# Patient Record
Sex: FEMALE | Race: WHITE | Employment: OTHER | URBAN - METROPOLITAN AREA
[De-identification: names, ages, dates, MRNs, and addresses within clinical notes are randomized per-mention and may not be internally consistent; named-entity substitution may affect disease eponyms.]

---

## 2017-06-16 ENCOUNTER — OFFICE VISIT (OUTPATIENT)
Dept: PHYSICAL THERAPY | Age: 82
End: 2017-06-16
Attending: ORTHOPAEDIC SURGERY
Payer: MEDICARE

## 2017-06-16 DIAGNOSIS — M19.012 GLENOHUMERAL ARTHRITIS, LEFT: Primary | ICD-10-CM

## 2017-06-16 PROCEDURE — 97110 THERAPEUTIC EXERCISES: CPT

## 2017-06-16 PROCEDURE — 97163 PT EVAL HIGH COMPLEX 45 MIN: CPT

## 2017-06-16 NOTE — PROGRESS NOTES
UPPER EXTREMITY EVALUATION:   Referring Physician: Dr. Kaya Roberts  Diagnosis: Left shoulder OA     Date of Service: 6/16/2017     PATIENT SUMMARY   Enriqueta Mcdaniel is a 80year old y/o female who presents to therapy today with complaints of left shoulder pa restrictions    Strength/MMT:  Shoulder Elbow Scapular   Flexion: R 4/5; L 2+/5  Abduction: R 4/5; L 2+/5  ER: R 4/5; L 2/5  IR: R 4/5; L 4/5 Flexion: R 4/5; L 4/5  Extension: R 4/5; L 4/5   Rhomboids: R 3-/5, L 3-/5  Mid trap: R 3-/5; L 3-/5        Nima Magaña actively participate in planning and for this course of care. Thank you for your referral. Please co-sign or sign and return this letter via fax as soon as possible to [phone].  If you have any questions, please contact me at Dept: [phone]    S

## 2017-06-20 ENCOUNTER — OFFICE VISIT (OUTPATIENT)
Dept: PHYSICAL THERAPY | Age: 82
End: 2017-06-20
Attending: ORTHOPAEDIC SURGERY
Payer: MEDICARE

## 2017-06-20 PROCEDURE — 97110 THERAPEUTIC EXERCISES: CPT

## 2017-06-20 PROCEDURE — 97140 MANUAL THERAPY 1/> REGIONS: CPT

## 2017-06-20 NOTE — PROGRESS NOTES
Dx: Glenohumeral arthritis, left (Z39.735)         Authorized # of Visits:           Next MD visit: none scheduled  Fall Risk: standard         Precautions: n/a             Subjective: No new problems.  Reports that she is working on HEP without increased p

## 2017-06-23 ENCOUNTER — APPOINTMENT (OUTPATIENT)
Dept: PHYSICAL THERAPY | Age: 82
End: 2017-06-23
Payer: MEDICARE

## 2017-06-26 ENCOUNTER — OFFICE VISIT (OUTPATIENT)
Dept: PHYSICAL THERAPY | Age: 82
End: 2017-06-26
Attending: ORTHOPAEDIC SURGERY
Payer: MEDICARE

## 2017-06-26 PROCEDURE — 97140 MANUAL THERAPY 1/> REGIONS: CPT

## 2017-06-26 PROCEDURE — 97110 THERAPEUTIC EXERCISES: CPT

## 2017-06-26 NOTE — PROGRESS NOTES
Dx: Glenohumeral arthritis, left (H64.849)         Authorized # of Visits:           Next MD visit: none scheduled  Fall Risk: standard         Precautions: n/a             Subjective: No new problems.  Reports that she was pretty sore the day after last th Seated: left sh abd to 90 deg x10  Flexion to 90 deg x10        Seated scapular squeeze 10x 5 sec Seated scapular squeeze 10x 5 sec        Seated left shoulder AROM flexion full available ROM x10 --        Seated: left shoulder AAROM cane flexion x10 --

## 2017-06-29 ENCOUNTER — APPOINTMENT (OUTPATIENT)
Dept: PHYSICAL THERAPY | Age: 82
End: 2017-06-29
Payer: MEDICARE

## 2017-07-03 ENCOUNTER — OFFICE VISIT (OUTPATIENT)
Dept: PHYSICAL THERAPY | Age: 82
End: 2017-07-03
Attending: INTERNAL MEDICINE
Payer: MEDICARE

## 2017-07-03 PROCEDURE — 97110 THERAPEUTIC EXERCISES: CPT

## 2017-07-03 PROCEDURE — 97140 MANUAL THERAPY 1/> REGIONS: CPT

## 2017-07-03 NOTE — PROGRESS NOTES
Dx: Glenohumeral arthritis, left (L55.537)         Authorized # of Visits:           Next MD visit: none scheduled  Fall Risk: standard         Precautions: n/a             Subjective: No new problems.  Reports that she is feeling better with the ability to AA/PROM left shoulder flex, abd x 10 minutes. Attempted IR/ER but too painful AA/PROM left shoulder flex, abd, ER x 10 minutes. AA/PROM left shoulder flex, abd, ER x 10 minutes.         Supine : AROM flexion to 90 deg x15 Supine : AROM flexion to 90 deg

## 2017-07-06 ENCOUNTER — APPOINTMENT (OUTPATIENT)
Dept: PHYSICAL THERAPY | Age: 82
End: 2017-07-06
Payer: MEDICARE

## 2017-07-07 ENCOUNTER — APPOINTMENT (OUTPATIENT)
Dept: PHYSICAL THERAPY | Age: 82
End: 2017-07-07
Payer: MEDICARE

## 2017-07-10 ENCOUNTER — APPOINTMENT (OUTPATIENT)
Dept: PHYSICAL THERAPY | Age: 82
End: 2017-07-10
Payer: MEDICARE

## 2019-10-12 ENCOUNTER — APPOINTMENT (OUTPATIENT)
Dept: CT IMAGING | Facility: HOSPITAL | Age: 84
End: 2019-10-12
Attending: EMERGENCY MEDICINE
Payer: MEDICARE

## 2019-10-12 ENCOUNTER — HOSPITAL ENCOUNTER (OUTPATIENT)
Facility: HOSPITAL | Age: 84
Setting detail: OBSERVATION
Discharge: NURSING, REHAB OR LONG-TERM CARE | End: 2019-10-17
Attending: EMERGENCY MEDICINE | Admitting: HOSPITALIST
Payer: MEDICARE

## 2019-10-12 DIAGNOSIS — R53.1 WEAKNESS: ICD-10-CM

## 2019-10-12 DIAGNOSIS — S22.080A COMPRESSION FRACTURE OF T12 VERTEBRA, INITIAL ENCOUNTER (HCC): ICD-10-CM
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
## 2019-10-12 DIAGNOSIS — N39.0 URINARY TRACT INFECTION WITHOUT HEMATURIA, SITE UNSPECIFIED: ICD-10-CM

## 2019-10-12 PROBLEM — R73.9 HYPERGLYCEMIA: Status: ACTIVE | Noted: 2019-10-12

## 2019-10-12 PROCEDURE — 70450 CT HEAD/BRAIN W/O DYE: CPT | Performed by: EMERGENCY MEDICINE

## 2019-10-12 PROCEDURE — 72125 CT NECK SPINE W/O DYE: CPT | Performed by: EMERGENCY MEDICINE

## 2019-10-12 PROCEDURE — 99220 INITIAL OBSERVATION CARE,LEVL III: CPT | Performed by: HOSPITALIST

## 2019-10-12 PROCEDURE — 72131 CT LUMBAR SPINE W/O DYE: CPT | Performed by: EMERGENCY MEDICINE

## 2019-10-12 RX ORDER — TRIAMTERENE AND HYDROCHLOROTHIAZIDE 37.5; 25 MG/1; MG/1
1 CAPSULE ORAL EVERY MORNING
Status: DISCONTINUED | OUTPATIENT
Start: 2019-10-12 | End: 2019-10-17

## 2019-10-12 RX ORDER — ALLOPURINOL 100 MG/1
100 TABLET ORAL DAILY
Status: DISCONTINUED | OUTPATIENT
Start: 2019-10-12 | End: 2019-10-17

## 2019-10-12 RX ORDER — ATENOLOL 50 MG/1
50 TABLET ORAL 2 TIMES DAILY
Status: DISCONTINUED | OUTPATIENT
Start: 2019-10-12 | End: 2019-10-17

## 2019-10-12 RX ORDER — SODIUM CHLORIDE 9 MG/ML
INJECTION, SOLUTION INTRAVENOUS CONTINUOUS
Status: ACTIVE | OUTPATIENT
Start: 2019-10-12 | End: 2019-10-12

## 2019-10-12 RX ORDER — ONDANSETRON 2 MG/ML
4 INJECTION INTRAMUSCULAR; INTRAVENOUS EVERY 6 HOURS PRN
Status: DISCONTINUED | OUTPATIENT
Start: 2019-10-12 | End: 2019-10-17

## 2019-10-12 RX ORDER — ACETAMINOPHEN 325 MG/1
650 TABLET ORAL EVERY 6 HOURS PRN
Status: DISCONTINUED | OUTPATIENT
Start: 2019-10-12 | End: 2019-10-17

## 2019-10-12 RX ORDER — ASPIRIN 81 MG/1
81 TABLET, CHEWABLE ORAL DAILY
Status: DISCONTINUED | OUTPATIENT
Start: 2019-10-12 | End: 2019-10-17

## 2019-10-12 RX ORDER — ATORVASTATIN CALCIUM 20 MG/1
20 TABLET, FILM COATED ORAL NIGHTLY
Status: DISCONTINUED | OUTPATIENT
Start: 2019-10-12 | End: 2019-10-17

## 2019-10-12 RX ORDER — LOSARTAN POTASSIUM 50 MG/1
25 TABLET ORAL DAILY
Status: DISCONTINUED | OUTPATIENT
Start: 2019-10-12 | End: 2019-10-17

## 2019-10-12 RX ORDER — POTASSIUM CHLORIDE 20 MEQ/1
40 TABLET, EXTENDED RELEASE ORAL ONCE
Status: COMPLETED | OUTPATIENT
Start: 2019-10-12 | End: 2019-10-12

## 2019-10-12 NOTE — ED NOTES
Report given to [facility]. Transport paged.

## 2019-10-12 NOTE — ED INITIAL ASSESSMENT (HPI)
Patient had unwitnessed fall in bathroom this morning. Patient's  went to bed at around midnight and woke up at 0330 to go to the bathroom when he heard her screaming. EMS arrived on scene with patient slumped against wall in the bathroom.  Per husba

## 2019-10-12 NOTE — ED NOTES
Pt yells from room \"I'm pee'ing\"  Pt sheets removed and cleaned prior to transport. New sheet and chux placed.

## 2019-10-12 NOTE — ED PROVIDER NOTES
Patient Seen in: [facility] Emergency Department      History   Patient presents with:  Fall (musculoskeletal, neurologic)    Stated Complaint: Fall    HPI    Patient is an 22-year-old female presents to ED after a fall.   History obtained through h 165/59   Pulse 67   Resp 18   Temp 97.7 °F (36.5 °C)   Temp src Temporal   SpO2 94 %   O2 Device None (Room air)       Current:/62 (BP Location: Left arm)   Pulse 70   Temp 98.6 °F (37 °C) (Oral)   Resp 16   Ht 154.9 cm (5' 1\")   Wt 75.8 kg   SpO2 9 -American 47 (*)     All other components within normal limits   URINALYSIS WITH CULTURE REFLEX - Abnormal; Notable for the following components:    Clarity Urine Cloudy (*)     Protein Urine 30  (*)     Leukocyte Esterase Urine Large (*)     WBC Ur sulci.  Ventricles are normal in size and location. INTRACRANIAL:  Symmetric low attenuation of cerebral white matter is consistent with chronic microvascular ischemic changes. No intracranial mass or hemorrhage. No sign of acute territorial infarction. narrowing. C4-C5:  Disc space narrowing and broad-based posterior disc/osteophyte complex. No spinal stenosis. Moderate bilateral facet hypertrophy and foraminal narrowing. C5-C6:  Disc space narrowing with partial fusion across the disc space.   Small br sclerosis and retrolisthesis. Broad-based posterior disc/osteophyte complex without spinal stenosis. Mild bilateral foraminal narrowing. L2-L3:  Vacuum disc degeneration and diffuse disc bulging without spinal stenosis.   Normal facet joints and neural fo vertebra, initial encounter [facility])  Weakness    Disposition:  Admit  10/12/2019  8:08 am    Follow-up:  Rigo Gotti, [address] [facility] 4 [phone]      As needed        Medications Prescribed:  Current Discha

## 2019-10-12 NOTE — ED NOTES
Attempt to walk pt with walker. Pt unsteady. Spouse sts gait is worse then usual. Spouse sts he is not comfortable taking pt home. MD updated.

## 2019-10-12 NOTE — ED NOTES
Round on pt. Pt encouraged to keep R arm straight for abx to infuse. No distress noted. Pt updated on eta to floor. Pt dry, no incontinence noted.  Pt denies any needs

## 2019-10-12 NOTE — CM/SW NOTE
Patient presents with her spouse. She has dementia and lives at home with him. They have no caregivers or help at home. Her spouse reports that she fell last week but was walking okay and they did not come to the ED to get evaluated.  This time, she was wea

## 2019-10-12 NOTE — H&P
ERASMO HOSPITALIST  History and Physical     Cesilia Osuna Patient Status:  Emergency    1934 MRN [id number]   Location [address] Attending Juliet Ly MD   Hosp Day # 0 PCP Sharlene Rose MD [facility]     Chief Complaint:  Bianca Daily Tab, Take 50 mg by mouth 2 (two) times daily. , Disp: , Rfl:   Atorvastatin Calcium (LIPITOR) 20 MG Oral Tab, Take 20 mg by mouth nightly., Disp: , Rfl:   Losartan Potassium (COZAAR) 25 MG Oral Tab, Take 25 mg by mouth daily. , Disp: , Rfl:   Debbie Monique [facility] antibiotics, await cultures  2. Compression fracture: Age is indeterminant, she has no tenderness at this time of her back. PT evaluation. No bracing at this time as it appears to be hold given clinical symptomatology. 3. Hypertension  4.  Hyperlipidemia

## 2019-10-13 PROCEDURE — 99225 SUBSEQUENT OBSERVATION CARE: CPT | Performed by: HOSPITALIST

## 2019-10-13 NOTE — PLAN OF CARE
Received pt from the ER about 1100. Pt weak, unable to walk. Using bedpan and start the pur wick . PT/OT consulted, will see if pt will need rehab.  Family at bedside, all questions and concerns addressed support given, will monitor

## 2019-10-13 NOTE — PHYSICAL THERAPY NOTE
Order received for physical Therapy evaluation. Noted in chart patient with MRI pending due to T12 fracture indeterminate age on CT. Per MD note PT following MRI as appropriate, confirmed with RN. Will reattempt PT services as able pending MRI results.

## 2019-10-13 NOTE — PROGRESS NOTES
ERASMO HOSPITALIST  Progress Note     Daria Cox Patient Status:  Observation    1934 MRN [id number]   [facility] 3NE-A Attending Stephan Eli MD   Hosp Day # 0 PCP Pauly Alexandra     Chief Complaint: Rudolph Rocha: Patient with Geovany Barekr mg Oral Nightly   • Dabigatran Etexilate Mesylate  75 mg Oral BID   • Losartan Potassium  25 mg Oral Daily   • Triamterene-HCTZ  1 capsule Oral QAM       ASSESSMENT / PLAN:     1. Back pain:  Has tenderness over T12, will check MRI prior to starting PT.   2

## 2019-10-13 NOTE — PLAN OF CARE
Pt is aaox4, forgetful, no diarrhea nor BM today, PT on hold until MRI thoracic is resulted as ordered, on iv abtx.   Problem: GENITOURINARY - ADULT  Goal: Absence of urinary retention  Description  INTERVENTIONS:  - Assess patient’s ability to void and emp needed  - Ensure adequate protection for wounds/incisions during mobilization  - Obtain PT/OT consults as needed  - Advance activity as appropriate  - Communicate ordered activity level and limitations with patient/family  Outcome: Progressing  Goal: Maint

## 2019-10-13 NOTE — PLAN OF CARE
Assumed care of patient at [address]. Denies any pain. Pt place on isolation per protocol  for loose stools. IV antibiotics. Plan PT/OT eval . Fall precautions in place.  Will continue to monitor

## 2019-10-14 ENCOUNTER — APPOINTMENT (OUTPATIENT)
Dept: MRI IMAGING | Facility: HOSPITAL | Age: 84
End: 2019-10-14
Attending: HOSPITALIST
Payer: MEDICARE

## 2019-10-14 PROCEDURE — 72157 MRI CHEST SPINE W/O & W/DYE: CPT | Performed by: HOSPITALIST

## 2019-10-14 PROCEDURE — 99225 SUBSEQUENT OBSERVATION CARE: CPT | Performed by: HOSPITALIST

## 2019-10-14 NOTE — PHYSICAL THERAPY NOTE
Noting MRI still pending. Inpt PT to hold until after the MRI, see inpt PT note from 10/13/19. Will continue to follow as appropriate.

## 2019-10-14 NOTE — PLAN OF CARE
Resumed care at [address]  A&Ox3 forgetful calm and cooperative  VS WNL, RA  Cardiac diet  Right ac SL  High fall risks   MRI of Thoracic Spine this am  Contact plus isolation d/c'd this am d/t no stool in last 24hrs  Will continue to monitor   Problem: Erick Sawyer ambulating w/ or w/o assistive devices  - Assist with transfers and ambulation using safe patient handling equipment as needed  - Ensure adequate protection for wounds/incisions during mobilization  - Obtain PT/OT consults as needed  - Advance activity as

## 2019-10-14 NOTE — OCCUPATIONAL THERAPY NOTE
Attempted to see pt this AM, pt still awaiting spine MRI, OT will follow up after spine MRI is complete per MD ACOSTA, RN in agreement.

## 2019-10-14 NOTE — OCCUPATIONAL THERAPY NOTE
OCCUPATIONAL THERAPY EVALUATION - INPATIENT     Room Number: 9702/5956-H  Evaluation Date: 10/14/2019  Type of Evaluation: Initial  Presenting Problem: fall, T12 compression fracture    Physician Order: IP Consult to Occupational Therapy  Reason for Kenan Montenegro dressing and supervision for sequencing.  min assist for in/out of shower, but otherwise pt showers on her own. SUBJECTIVE   Pt stated, \"I am weaker than at home. \"   stating that he cannot take care of her when requiring this level of a Score:   Score: 19  Approx Degree of Impairment: 42.8%  Standardized Score (AM-PAC Scale): 40.22  CMS Modifier (G-Code): CK    FUNCTIONAL TRANSFER ASSESSMENT  Supine to Sit : Minimum assistance  Sit to Stand: Minimum assistance    Skilled Therapy Provided: Comorbidities and min to mod modifications of tasks    Clinical Decision Making MODERATE - Analysis of occupational profile, detailed assessments, several treatment options    Overall Complexity MODERATE     OT Discharge Recommendations: Sub-acute rehabili

## 2019-10-14 NOTE — PHYSICAL THERAPY NOTE
PHYSICAL THERAPY EVALUATION - INPATIENT     Room Number: 3324/0321-O  Evaluation Date: 10/14/2019  Type of Evaluation: Initial  Physician Order: PT Eval and Treat    Presenting Problem: T12 compression fracture; fall at home (2x in past 7 days);  UTI  R ENDOSCOPY   • KNEE REPLACEMENT SURGERY     • LUMPECTOMY RIGHT         HOME SITUATION  Type of Home: House   Home Layout: One level  Stairs to Enter : 2  Railing: Yes  Stairs to Bedroom: 0       Lives With: Spouse  Drives: No  Patient Owned Equipment: Other except for the following:    Right Hip flexion  3+/5  Left Hip flexion  3+/5    BALANCE  Static Sitting: Fair +  Dynamic Sitting: Poor +  Static Standing: Fair -  Dynamic Standing: Poor +    ADDITIONAL TESTS  Additional Tests: Elderly Mobility Scale     El Instructed in log rolling for pain control and then mobility as above. During ambulation, min assist of 1 for pt safety and for advancing RW in straight line; along with chair follow.      Activity recommendations = Amb with nursing assist <50 ft an independent bed mobility, transfers and gait. The patient is below baseline and would benefit from skilled inpatient PT to address the above deficits to assist patient in returning to prior level of function.     Subacute rehab is recommended for 9-11 day

## 2019-10-15 PROCEDURE — 99225 SUBSEQUENT OBSERVATION CARE: CPT | Performed by: HOSPITALIST

## 2019-10-15 NOTE — PROGRESS NOTES
ERASMO HOSPITALIST  Progress Note     Shahbaz Rivera Patient Status:  Observation    1934 MRN [id number]   [facility] 3NE-A Attending Elliott Norman [address] Day # 0 PCP Kody Villarreal     Chief Complaint: Lakeshia Birch: Patient se Imaging: Imaging data reviewed in Epic.     Medications:   • cefTRIAXone  1 g Intravenous Q24H   • allopurinol  100 mg Oral Daily   • atenolol  50 mg Oral BID   • aspirin  81 mg Oral Daily   • atorvastatin  20 mg Oral Nightly   • Dabigatran Etexilate Me

## 2019-10-15 NOTE — CM/SW NOTE
MSW met with patient and her spouse at bedside, provided JUVENAL list off insurance website. Pt has hx at [facility] and wanted referral sent. MSW sent referral in ECIN at 3:35pm. DON already requested.

## 2019-10-15 NOTE — PLAN OF CARE
Assumed pt care at 0730. Afib on tele. VSS. . Pt now on room air, tolerating well. A&Ox2. Pt is disoriented to time and situation. Right PIV saline locked. Pt denies pain. Denies N/V. Pt tolerating diet well. Pt voiding well. Last BM today.    B safest level of function  Description  INTERVENTIONS:  - Assess patient stability and activity tolerance for standing, transferring and ambulating w/ or w/o assistive devices  - Assist with transfers and ambulation using safe patient handling equipment as

## 2019-10-15 NOTE — PLAN OF CARE
Resumed care at 1930  Pt A&O to self, place and date  Forgetful at times  Up with 1 and walker  High risk fall precautions  Early this am heart rate on  noted to decrease to 39, and came right back up to heart rate of 50's-60's , pt asymptomatic.  Tele m appropriate  Outcome: Progressing     Problem: MUSCULOSKELETAL - ADULT  Goal: Return mobility to safest level of function  Description  INTERVENTIONS:  - Assess patient stability and activity tolerance for standing, transferring and ambulating w/ or w/o as

## 2019-10-15 NOTE — PROGRESS NOTES
ERASMO HOSPITALIST  Progress Note     Dea Castillo Patient Status:  Observation    1934 MRN [id number]   [facility] 3NE-A Attending Vince Mcclain [address] Day # 0 PCP Eneida Rock     Chief Complaint: Cristofer Charlton: Patient se Imaging: Imaging data reviewed in Epic.     Medications:   • cefTRIAXone  1 g Intravenous Q24H   • allopurinol  100 mg Oral Daily   • atenolol  50 mg Oral BID   • aspirin  81 mg Oral Daily   • atorvastatin  20 mg Oral Nightly   • Dabigatran Etexilate Me

## 2019-10-16 PROCEDURE — 99225 SUBSEQUENT OBSERVATION CARE: CPT | Performed by: HOSPITALIST

## 2019-10-16 NOTE — PLAN OF CARE
Assumed pt care at 0730. Pt A&Ox3. Disoriented to month. VSS. Afib on tele. Room air. Right PIV saline locked. Pt voiding well in bed pan. Pt tolerating diet well. Last BM 10/15. Pt denies any pain. Denies any N/V. Awaiting JUVENAL insurance auth. level of function  Description  INTERVENTIONS:  - Assess patient stability and activity tolerance for standing, transferring and ambulating w/ or w/o assistive devices  - Assist with transfers and ambulation using safe patient handling equipment as needed

## 2019-10-16 NOTE — CM/SW NOTE
3:51pm  MSW called [facility] to see if they have insurance auth:   Coy Chao is still pending per admissions

## 2019-10-16 NOTE — PROGRESS NOTES
ERASMO HOSPITALIST  Progress Note     Jordyn Rodriguez Patient Status:  Observation    1934 MRN [id number]   [facility] 3NE-A Attending Harvey Barrios [address] Day # 0 PCP Mic Hand     Chief Complaint: Primo Rape: Patient do Epic.    Medications:   • allopurinol  100 mg Oral Daily   • atenolol  50 mg Oral BID   • aspirin  81 mg Oral Daily   • atorvastatin  20 mg Oral Nightly   • Dabigatran Etexilate Mesylate  75 mg Oral BID   • Losartan Potassium  25 mg Oral Daily   • Triamter

## 2019-10-17 VITALS
HEART RATE: 69 BPM | HEIGHT: 61 IN | TEMPERATURE: 98 F | RESPIRATION RATE: 18 BRPM | DIASTOLIC BLOOD PRESSURE: 48 MMHG | SYSTOLIC BLOOD PRESSURE: 113 MMHG | BODY MASS INDEX: 31.53 KG/M2 | OXYGEN SATURATION: 94 % | WEIGHT: 167 LBS

## 2019-10-17 PROCEDURE — 99217 OBSERVATION CARE DISCHARGE: CPT | Performed by: HOSPITALIST

## 2019-10-17 NOTE — PROGRESS NOTES
ERASMO HOSPITALIST  Progress Note     Yvonne Wade Patient Status:  Observation    1934 MRN [id number]   [facility] 3NE-A Attending Nena Bella [address] Day # 0 PCP Ba Bell     Chief Complaint: Arielle Metcalf: Patient do Imaging data reviewed in Epic.     Medications:   • allopurinol  100 mg Oral Daily   • atenolol  50 mg Oral BID   • aspirin  81 mg Oral Daily   • atorvastatin  20 mg Oral Nightly   • Dabigatran Etexilate Mesylate  75 mg Oral BID   • Losartan Potassium  25 m

## 2019-10-17 NOTE — PLAN OF CARE
A&Ox2-3. Room air. Afib on tele, on pradaxa. No complaints of pain. TLSO brace while up. IV rocephin. Plan for d/c to [facility] when insurance auth completed. No further needs. Report given. Care endorsed to [facility].        Problem: GENITOURINARY - ADULT or w/o assistive devices  - Assist with transfers and ambulation using safe patient handling equipment as needed  - Ensure adequate protection for wounds/incisions during mobilization  - Obtain PT/OT consults as needed  - Advance activity as appropriate  -

## 2019-10-17 NOTE — PLAN OF CARE
Assumed pt care @ 0730. Alert & oriented x 3. Forgetful at times. Calm, cooperative. VSS. Denies pain. On room air. Afib on telemetry. On pradaxa. Tolerating cardiac diet. Denies nausea/vomiting. Voiding well.  Up with assist with TLSO brace to chair for me ADULT  Goal: Return mobility to safest level of function  Description  INTERVENTIONS:  - Assess patient stability and activity tolerance for standing, transferring and ambulating w/ or w/o assistive devices  - Assist with transfers and ambulation using saf

## 2019-10-17 NOTE — PLAN OF CARE
Assumed patient care at [phone]. Patient alert and oriented X4. Comfortably resting in bed. Vital signs stable. Afebrile. IV antibiotics completed. Fall precautions in place. Needs and concerns addressed. Call light in reach. Will continue Plan of care.

## 2019-10-17 NOTE — CM/SW NOTE
MSW spoke to spouse who is agreeable to cost of medicar.  DC is 6:30pm [address] [facility] [phone]    To [facility]  [phone]

## 2019-10-17 NOTE — PHYSICAL THERAPY NOTE
PHYSICAL THERAPY TREATMENT NOTE - INPATIENT    Room Number: 8565/3709-J     Session: 1   Number of Visits to Meet Established Goals: 5    Presenting Problem: T12 compression fracture; fall at home (2x in past 7 days);  UTI    Problem List  Principal Proble bed?: A Little   How much help from another person does the patient currently need. ..   -   Moving to and from a bed to a chair (including a wheelchair)?: A Little   -   Need to walk in hospital room?: A Little   -   Climbing 3-5 steps with a railing?: A L to/from Stand at assistance level: supervision      Goal #3 Patient is able to ambulate 150 feet with assist device: walker - rolling at assistance level: supervision      Goal #4 Asc/danielle 2 steps with railing and min assist.    Goal #5     Goal #6     Goal

## 2019-10-18 NOTE — PLAN OF CARE
Report called to [facility] @ 2055. [facility] updated that [employer] is running late.

## 2019-10-18 NOTE — PLAN OF CARE
NURSING DISCHARGE NOTE    Discharged Rehab facility via [facility]. Accompanied by Support staff  Belongings Taken by patient/family. Pt discharged to [facility] [place] in calm, stable status. Pt updated on plan of transportation.  Paperwork provided an

## 2019-10-18 NOTE — DISCHARGE SUMMARY
[facility] HOSPITALIST  DISCHARGE SUMMARY     Saqib Martínez Patient Status:  Observation    1934 MRN [id number]   [facility] 3NE-A Attending No att. providers found   Hosp Day # 0 PCP Rom Allen     Date of Admission: 10/12/2019  Date 81 mg by mouth daily. Refills:  0     atenolol 50 MG Tabs  Commonly known as:  TENORMIN      Take 50 mg by mouth 2 (two) times daily. Refills:  0     atorvastatin 20 MG Tabs  Commonly known as:  LIPITOR      Take 20 mg by mouth nightly.    Refills:  0

## 2020-01-11 ENCOUNTER — HOSPITAL ENCOUNTER (INPATIENT)
Facility: HOSPITAL | Age: 85
LOS: 1 days | Discharge: HOME HEALTH | DRG: 202 | End: 2020-01-15
Attending: EMERGENCY MEDICINE | Admitting: HOSPITALIST
Payer: MEDICARE

## 2020-01-11 ENCOUNTER — APPOINTMENT (OUTPATIENT)
Dept: GENERAL RADIOLOGY | Facility: HOSPITAL | Age: 85
DRG: 202 | End: 2020-01-11
Attending: EMERGENCY MEDICINE
Payer: MEDICARE

## 2020-01-11 DIAGNOSIS — J40 BRONCHITIS: Primary | ICD-10-CM

## 2020-01-11 DIAGNOSIS — J98.01 BRONCHOSPASM: ICD-10-CM

## 2020-01-11 LAB
ADENOVIRUS PCR:: NEGATIVE
ALBUMIN SERPL-MCNC: 3.5 G/DL (ref 3.4–5)
ALBUMIN/GLOB SERPL: 0.9 {RATIO} (ref 1–2)
ALP LIVER SERPL-CCNC: 107 U/L (ref 55–142)
ALT SERPL-CCNC: 16 U/L (ref 13–56)
ANION GAP SERPL CALC-SCNC: 6 MMOL/L (ref 0–18)
AST SERPL-CCNC: 22 U/L (ref 15–37)
B PERT DNA SPEC QL NAA+PROBE: NEGATIVE
BASOPHILS # BLD AUTO: 0.09 X10(3) UL (ref 0–0.2)
BASOPHILS NFR BLD AUTO: 0.7 %
BILIRUB SERPL-MCNC: 0.7 MG/DL (ref 0.1–2)
BILIRUB UR QL STRIP.AUTO: NEGATIVE
BUN BLD-MCNC: 13 MG/DL (ref 7–18)
BUN/CREAT SERPL: 16.5 (ref 10–20)
C PNEUM DNA SPEC QL NAA+PROBE: NEGATIVE
CALCIUM BLD-MCNC: 9.1 MG/DL (ref 8.5–10.1)
CHLORIDE SERPL-SCNC: 108 MMOL/L (ref 98–112)
CO2 SERPL-SCNC: 28 MMOL/L (ref 21–32)
COLOR UR AUTO: YELLOW
CORONAVIRUS 229E PCR:: NEGATIVE
CORONAVIRUS HKU1 PCR:: NEGATIVE
CORONAVIRUS NL63 PCR:: NEGATIVE
CORONAVIRUS OC43 PCR:: NEGATIVE
CREAT BLD-MCNC: 0.79 MG/DL (ref 0.55–1.02)
DEPRECATED RDW RBC AUTO: 50.7 FL (ref 35.1–46.3)
EOSINOPHIL # BLD AUTO: 0.26 X10(3) UL (ref 0–0.7)
EOSINOPHIL NFR BLD AUTO: 2.1 %
ERYTHROCYTE [DISTWIDTH] IN BLOOD BY AUTOMATED COUNT: 15.1 % (ref 11–15)
FLUAV RNA SPEC QL NAA+PROBE: NEGATIVE
FLUBV RNA SPEC QL NAA+PROBE: NEGATIVE
GLOBULIN PLAS-MCNC: 3.7 G/DL (ref 2.8–4.4)
GLUCOSE BLD-MCNC: 99 MG/DL (ref 70–99)
GLUCOSE UR STRIP.AUTO-MCNC: NEGATIVE MG/DL
HCT VFR BLD AUTO: 39.5 % (ref 35–48)
HGB BLD-MCNC: 12.6 G/DL (ref 12–16)
IMM GRANULOCYTES # BLD AUTO: 0.12 X10(3) UL (ref 0–1)
IMM GRANULOCYTES NFR BLD: 0.9 %
KETONES UR STRIP.AUTO-MCNC: NEGATIVE MG/DL
LYMPHOCYTES # BLD AUTO: 2.91 X10(3) UL (ref 1–4)
LYMPHOCYTES NFR BLD AUTO: 23 %
M PROTEIN MFR SERPL ELPH: 7.2 G/DL (ref 6.4–8.2)
MCH RBC QN AUTO: 29.2 PG (ref 26–34)
MCHC RBC AUTO-ENTMCNC: 31.9 G/DL (ref 31–37)
MCV RBC AUTO: 91.4 FL (ref 80–100)
METAPNEUMOVIRUS PCR:: POSITIVE
MONOCYTES # BLD AUTO: 1.08 X10(3) UL (ref 0.1–1)
MONOCYTES NFR BLD AUTO: 8.5 %
MYCOPLASMA PNEUMONIA PCR:: NEGATIVE
NEUTROPHILS # BLD AUTO: 8.18 X10 (3) UL (ref 1.5–7.7)
NEUTROPHILS # BLD AUTO: 8.18 X10(3) UL (ref 1.5–7.7)
NEUTROPHILS NFR BLD AUTO: 64.8 %
NITRITE UR QL STRIP.AUTO: POSITIVE
OSMOLALITY SERPL CALC.SUM OF ELEC: 294 MOSM/KG (ref 275–295)
PARAINFLUENZA 1 PCR:: NEGATIVE
PARAINFLUENZA 2 PCR:: NEGATIVE
PARAINFLUENZA 3 PCR:: NEGATIVE
PARAINFLUENZA 4 PCR:: NEGATIVE
PH UR STRIP.AUTO: 5 [PH] (ref 4.5–8)
PLATELET # BLD AUTO: 367 10(3)UL (ref 150–450)
POTASSIUM SERPL-SCNC: 3.8 MMOL/L (ref 3.5–5.1)
PROT UR STRIP.AUTO-MCNC: 30 MG/DL
RBC # BLD AUTO: 4.32 X10(6)UL (ref 3.8–5.3)
RBC UR QL AUTO: NEGATIVE
RHINOVIRUS/ENTERO PCR:: NEGATIVE
RSV RNA SPEC QL NAA+PROBE: NEGATIVE
SODIUM SERPL-SCNC: 142 MMOL/L (ref 136–145)
SP GR UR STRIP.AUTO: 1.01 (ref 1–1.03)
UROBILINOGEN UR STRIP.AUTO-MCNC: <2 MG/DL
WBC # BLD AUTO: 12.6 X10(3) UL (ref 4–11)
WBC #/AREA URNS AUTO: >50 /HPF
WBC CLUMPS UR QL AUTO: PRESENT

## 2020-01-11 PROCEDURE — 99223 1ST HOSP IP/OBS HIGH 75: CPT | Performed by: HOSPITALIST

## 2020-01-11 PROCEDURE — 71045 X-RAY EXAM CHEST 1 VIEW: CPT | Performed by: EMERGENCY MEDICINE

## 2020-01-11 RX ORDER — METOCLOPRAMIDE HYDROCHLORIDE 5 MG/ML
5 INJECTION INTRAMUSCULAR; INTRAVENOUS EVERY 8 HOURS PRN
Status: DISCONTINUED | OUTPATIENT
Start: 2020-01-11 | End: 2020-01-15

## 2020-01-11 RX ORDER — ATENOLOL 50 MG/1
50 TABLET ORAL
Status: DISCONTINUED | OUTPATIENT
Start: 2020-01-12 | End: 2020-01-12

## 2020-01-11 RX ORDER — TRIAMTERENE AND HYDROCHLOROTHIAZIDE 37.5; 25 MG/1; MG/1
1 CAPSULE ORAL EVERY MORNING
Status: DISCONTINUED | OUTPATIENT
Start: 2020-01-12 | End: 2020-01-12

## 2020-01-11 RX ORDER — METHYLPREDNISOLONE SODIUM SUCCINATE 125 MG/2ML
125 INJECTION, POWDER, LYOPHILIZED, FOR SOLUTION INTRAMUSCULAR; INTRAVENOUS ONCE
Status: COMPLETED | OUTPATIENT
Start: 2020-01-11 | End: 2020-01-11

## 2020-01-11 RX ORDER — ACETAMINOPHEN 325 MG/1
650 TABLET ORAL EVERY 6 HOURS PRN
Status: DISCONTINUED | OUTPATIENT
Start: 2020-01-11 | End: 2020-01-15

## 2020-01-11 RX ORDER — VITS A,C,E/LUTEIN/MINERALS 300MCG-200
2 TABLET ORAL 2 TIMES DAILY
Status: DISCONTINUED | OUTPATIENT
Start: 2020-01-11 | End: 2020-01-15

## 2020-01-11 RX ORDER — GUAIFENESIN 600 MG
1200 TABLET, EXTENDED RELEASE 12 HR ORAL 2 TIMES DAILY
Status: DISCONTINUED | OUTPATIENT
Start: 2020-01-11 | End: 2020-01-15

## 2020-01-11 RX ORDER — ALLOPURINOL 100 MG/1
100 TABLET ORAL DAILY
Status: DISCONTINUED | OUTPATIENT
Start: 2020-01-11 | End: 2020-01-15

## 2020-01-11 RX ORDER — IPRATROPIUM BROMIDE AND ALBUTEROL SULFATE 2.5; .5 MG/3ML; MG/3ML
3 SOLUTION RESPIRATORY (INHALATION) ONCE
Status: COMPLETED | OUTPATIENT
Start: 2020-01-11 | End: 2020-01-11

## 2020-01-11 RX ORDER — ASPIRIN 81 MG/1
81 TABLET, CHEWABLE ORAL DAILY
Status: DISCONTINUED | OUTPATIENT
Start: 2020-01-11 | End: 2020-01-15

## 2020-01-11 RX ORDER — ONDANSETRON 2 MG/ML
4 INJECTION INTRAMUSCULAR; INTRAVENOUS EVERY 6 HOURS PRN
Status: DISCONTINUED | OUTPATIENT
Start: 2020-01-11 | End: 2020-01-15

## 2020-01-11 RX ORDER — ATORVASTATIN CALCIUM 20 MG/1
20 TABLET, FILM COATED ORAL NIGHTLY
Status: DISCONTINUED | OUTPATIENT
Start: 2020-01-11 | End: 2020-01-15

## 2020-01-11 RX ORDER — LOSARTAN POTASSIUM 25 MG/1
25 TABLET ORAL DAILY
Status: DISCONTINUED | OUTPATIENT
Start: 2020-01-11 | End: 2020-01-12

## 2020-01-11 RX ORDER — IPRATROPIUM BROMIDE AND ALBUTEROL SULFATE 2.5; .5 MG/3ML; MG/3ML
SOLUTION RESPIRATORY (INHALATION)
Status: COMPLETED
Start: 2020-01-11 | End: 2020-01-11

## 2020-01-12 PROCEDURE — 99232 SBSQ HOSP IP/OBS MODERATE 35: CPT | Performed by: HOSPITALIST

## 2020-01-12 RX ORDER — AMLODIPINE BESYLATE 5 MG/1
5 TABLET ORAL DAILY
Status: DISCONTINUED | OUTPATIENT
Start: 2020-01-12 | End: 2020-01-12

## 2020-01-12 RX ORDER — POTASSIUM CHLORIDE 20 MEQ/1
20 TABLET, EXTENDED RELEASE ORAL DAILY
COMMUNITY

## 2020-01-12 RX ORDER — SODIUM CHLORIDE, SODIUM LACTATE, POTASSIUM CHLORIDE, CALCIUM CHLORIDE 600; 310; 30; 20 MG/100ML; MG/100ML; MG/100ML; MG/100ML
INJECTION, SOLUTION INTRAVENOUS ONCE
Status: COMPLETED | OUTPATIENT
Start: 2020-01-12 | End: 2020-01-12

## 2020-01-12 RX ORDER — AMLODIPINE BESYLATE 5 MG/1
5 TABLET ORAL DAILY
COMMUNITY

## 2020-01-12 RX ORDER — FUROSEMIDE 20 MG/1
20 TABLET ORAL DAILY
Status: DISCONTINUED | OUTPATIENT
Start: 2020-01-12 | End: 2020-01-12

## 2020-01-12 RX ORDER — HYDRALAZINE HYDROCHLORIDE 25 MG/1
25 TABLET, FILM COATED ORAL 3 TIMES DAILY
COMMUNITY

## 2020-01-12 RX ORDER — ALBUTEROL SULFATE 2.5 MG/3ML
2.5 SOLUTION RESPIRATORY (INHALATION)
Status: DISCONTINUED | OUTPATIENT
Start: 2020-01-12 | End: 2020-01-15

## 2020-01-12 RX ORDER — CARVEDILOL 6.25 MG/1
6.25 TABLET ORAL 2 TIMES DAILY WITH MEALS
Status: ON HOLD | COMMUNITY
End: 2020-06-09

## 2020-01-12 RX ORDER — CARVEDILOL 6.25 MG/1
6.25 TABLET ORAL 2 TIMES DAILY WITH MEALS
Status: DISCONTINUED | OUTPATIENT
Start: 2020-01-12 | End: 2020-01-15

## 2020-01-12 RX ORDER — HYDRALAZINE HYDROCHLORIDE 25 MG/1
25 TABLET, FILM COATED ORAL 3 TIMES DAILY
Status: DISCONTINUED | OUTPATIENT
Start: 2020-01-12 | End: 2020-01-15

## 2020-01-12 RX ORDER — FUROSEMIDE 20 MG/1
20 TABLET ORAL DAILY
COMMUNITY

## 2020-01-12 RX ORDER — PREDNISONE 20 MG/1
40 TABLET ORAL
Status: DISCONTINUED | OUTPATIENT
Start: 2020-01-12 | End: 2020-01-15

## 2020-01-12 RX ORDER — POTASSIUM CHLORIDE 20 MEQ/1
40 TABLET, EXTENDED RELEASE ORAL ONCE
Status: COMPLETED | OUTPATIENT
Start: 2020-01-12 | End: 2020-01-12

## 2020-01-12 RX ORDER — LOSARTAN POTASSIUM 50 MG/1
50 TABLET ORAL DAILY
Status: DISCONTINUED | OUTPATIENT
Start: 2020-01-12 | End: 2020-01-15

## 2020-01-12 NOTE — PROGRESS NOTES
ERASMO HOSPITALIST  Progress note     Annie Zee Patient Status:  Emergency    1934 MRN [id number]   Location [address] [facility] Attending Rosa Patel, [address] Day # 0 PCP Nelli Ibanez MD [facility]     Chief Complaint: cough    Sub Prophylaxis: ambulate,pradaxa already used by patient  · CODE status: full  · Baez: no    Plan of care discussed with patient and rn    Reggie Esposito MD  [facility]  Internal Medicine Hospitalist  Pager [phone]

## 2020-01-12 NOTE — ED NOTES
Respiratory at bedside to admin another breathing treatment to Pt as she cont to have expiratory wheezes Patient's belongings returned

## 2020-01-12 NOTE — PLAN OF CARE
Received patient from ER a/ox2-3, forgetful. Denies pain. Continues to have dyspnea on exertion. O2 sats on RA 92%. No new complaints. She was updated on plan of care and needs reinforcement.       Problem: RESPIRATORY - ADULT  Goal: Achieves optimal ventil

## 2020-01-12 NOTE — DIETARY NOTE
[address]     Admitting diagnosis:  Bronchitis [J40]  Bronchospasm [J98.01]    Ht: 154.9 cm (5' 1\")  Wt: 75.8 kg (167 lb 1.7 oz). This is 159% of IBW  Body mass index is 31.57 kg/m².   IBW: 47 kg    Wt Hx  01/11/20

## 2020-01-12 NOTE — PHYSICAL THERAPY NOTE
PHYSICAL THERAPY EVALUATION - INPATIENT     Room Number: 524/524-A  Evaluation Date: 1/12/2020  Type of Evaluation: Initial  Physician Order: PT Eval and Treat    Presenting Problem: difficulty breathing, cough  Reason for Therapy: Mobility Dysfuncti provided min assist with dressing & supervision for sequencing, min assist for in/out of shower but pt showers on her own.     SUBJECTIVE  \"I can get do it with you\" referring to PT    Patient self-stated goal is return home    OBJECTIVE  Precautions: (co Maximum assistance(actual cga per FIM score per dept policy)  Distance (ft): 25  Assistive Device: Rolling walker  Pattern: Within Functional Limits  Stoop/Curb Assistance: Not tested       Skilled Therapy Provided: Received pt in supine position in bed, p PT to address the above deficits to assist patient in returning to prior to level of function.   DISCHARGE RECOMMENDATIONS  PT Discharge Recommendations: Sub-acute rehabilitation-anticipate pt to need 11-14 days & pt needs to be modified independent gait wi

## 2020-01-12 NOTE — PLAN OF CARE
Pt AOx2-3. Forgetful at times. Patient maintaining O2 sats on RA and receiving nebs q6hr and PO steroids. Patient w/ hx of a-fib and receives pradaxa. Patient briefed and incontinent at times. Patient receiving IV abx. Patient and family updated on POC.  WC

## 2020-01-12 NOTE — H&P
ERASMO HOSPITALIST  History and Physical     [facility] Patient Status:  Emergency    1934 MRN [id number]   Location [address] Attending Ruth Pollock, [address] Day # 0 PCP Favio Montes MD [facility]     Chief Complaint: cough by mouth 2 (two) times daily. , Disp: , Rfl:   Atorvastatin Calcium (LIPITOR) 20 MG Oral Tab, Take 20 mg by mouth nightly., Disp: , Rfl:   Losartan Potassium (COZAAR) 25 MG Oral Tab, Take 25 mg by mouth daily. , Disp: , Rfl:   Prudence Charity (Prudence Charity) Oral Tab, Jg Coughlin Imaging data reviewed in Epic. ASSESSMENT / PLAN:     1. Viral bronchitis with bronchospasms  1. RVP: Metapneumovirus  2. CXR noted  3. Nebs  4. Symptomatic control  5. Gentle fluid hydration  6. Steroids if nec, bronchospasms mild on my exam  2.  A. F

## 2020-01-12 NOTE — PLAN OF CARE
Patient arrived to the floor with medications reconciled by Dr. Juliet Gee before they could be reviewed by RN. Prior to admission meds different than what were ordered. Med rec fixed and Dr. Juliet Gee paged to see if he could review the med rec again.  Still awai

## 2020-01-12 NOTE — ED PROVIDER NOTES
Patient Seen in: [facility] Emergency Department      History   Patient presents with:  Dyspnea BROOK SOB    Stated Complaint: dyspnea    HPI    This is an 70-year-old female with past medical history of A. beverly on baby aspirin, breast cancer, compress [01/11/20 1932]   /70   Pulse 94   Resp 20   Temp 97.4 °F (36.3 °C)   Temp src Temporal   SpO2 95 %   O2 Device None (Room air)       Current:/69   Pulse 103   Temp 97.4 °F (36.3 °C) (Temporal)   Resp 20   Ht 154.9 cm (5' 1\")   Wt 75.8 kg   Sp for panel order CBC WITH DIFFERENTIAL WITH PLATELET.   Procedure                               Abnormality         Status                     ---------                               -----------         ------                     CBC W/ DIFFERENTIAL[[id number] on floor. Positive nitrite, large leuk esterase, greater than 50 WBCs, 6-10 RBCs and 1+ bacteria. Findings were communicated to Dr. Shayy Lam who will place an order for antibiotics. Patient will be admitted for viral bronchitis with bronchospasm.   Ayden Stephenson

## 2020-01-13 LAB — POTASSIUM SERPL-SCNC: 4.7 MMOL/L (ref 3.5–5.1)

## 2020-01-13 PROCEDURE — 99232 SBSQ HOSP IP/OBS MODERATE 35: CPT | Performed by: HOSPITALIST

## 2020-01-13 NOTE — PHYSICAL THERAPY NOTE
PHYSICAL THERAPY TREATMENT NOTE - INPATIENT    Room Number: 524/524-A     Session: 1   Number of Visits to Meet Established Goals: 3     History related to current admission:  Pt came in for cough, difficulty breathing from JUVENAL; pt had symptoms x 2 days p AM-PAC '6-Clicks' INPATIENT SHORT FORM - BASIC MOBILITY  How much difficulty does the patient currently have. ..  -   Turning over in bed (including adjusting bedclothes, sheets and blankets)?: A Little   -   Sitting down on and standing up from a c does not meet criteria for skilled inpatient physical therapy services, however patient will remain on Inpatient Mobility Team and will continue with supervised ambulation to maintain current level of mobility.    The rehab aide will perform treatment activ

## 2020-01-13 NOTE — PLAN OF CARE
Pt A&Ox3. Forgetful has hx of dementia. Disoriented to year. O2 sats WNL on room air. Not on tele. Tolerating diet. Briefed incontinent at times. SBA when voiding to bathroom. No c/o pain. Up w/th a walker. Po steroids. IV abx. Pt resting comfortably. VSS.

## 2020-01-13 NOTE — PLAN OF CARE
Problem: Patient/Family Goals  Goal: Patient/Family Long Term Goal  Description  Patient's Long Term Goal: Be discharged home.     Interventions:  - Nebs, IV antibiotics  - See additional Care Plan goals for specific interventions   Outcome: Progressing diagnosis. .. [] Pneumonia     [] COPD    [] Home Health set up.    [] Care partner identified and updated with the plan of care. Patient is A/Ox3, history of dementia. [facility] >92%. Vitals are stable.  Patient on Pradaxa

## 2020-01-13 NOTE — PROGRESS NOTES
[facility]  Progress note     Loyce Goods Patient Status:  Emergency    1934 MRN [id number]   Location [address] Attending Mago Mckee, [address] [facility] Day # 0 PCP Pauly Ortiz MD [facility]     Chief Complaint: cough    Sub resumed  4. DL  5. Possible uti-monitor cx; empiric rocephin    Plan of care: restart lasix tomorrow; continue steroids/abx.   Hopefully d/c Tuesday or wednesday    Quality:  · DVT Prophylaxis: ambulate,pradaxa   · CODE status: full  · Baez: no    Plan of

## 2020-01-14 PROCEDURE — 99232 SBSQ HOSP IP/OBS MODERATE 35: CPT | Performed by: HOSPITALIST

## 2020-01-14 RX ORDER — FUROSEMIDE 20 MG/1
20 TABLET ORAL DAILY
Status: DISCONTINUED | OUTPATIENT
Start: 2020-01-14 | End: 2020-01-15

## 2020-01-14 RX ORDER — CEPHALEXIN 500 MG/1
500 CAPSULE ORAL EVERY 8 HOURS
Status: DISCONTINUED | OUTPATIENT
Start: 2020-01-15 | End: 2020-01-15

## 2020-01-14 NOTE — PLAN OF CARE
Pt A&Ox3. Forgetful has hx of dementia. Disoriented to year. O2 sats WNL on 2L of oxygen. Not on tele. Tolerating diet. Briefed incontinent at times. SBA when voiding to bathroom. No c/o pain. Up w/th a walker. Pt resting comfortably. VSS. WCTM.      Proble

## 2020-01-14 NOTE — PROGRESS NOTES
[facility]  Progress note     Oleg Ket Patient Status:  Emergency    1934 MRN [id number]   Location [address] Attending Booker Chamorro, [address] [facility] Day # 0 PCP Fortunato Mansfield MD [facility]     Chief Complaint: cough    Sub lasix held; restart lasix today; continue to hold amlodipine  2. BB/ACE-I/Hydral resumed  4. DL  5. uti-switch to ancef    OT eval, O2 walk test.  Possible d/c tomorrow?     Quality:  · DVT Prophylaxis: ambulate,pradaxa   · CODE status: full  · Baez: no

## 2020-01-14 NOTE — PLAN OF CARE
AxO x3, forgetful, short term memory loss. Weaned to 1L at rest, baseline room air, left lung with rhinchi and expiratory wheezes, diminished on right. Productive cough with thick yellow sputum. PO steroids, nebs. On pradaxa for A Fib.  Briefed, incontinent

## 2020-01-15 ENCOUNTER — APPOINTMENT (OUTPATIENT)
Dept: GENERAL RADIOLOGY | Facility: HOSPITAL | Age: 85
DRG: 202 | End: 2020-01-15
Attending: HOSPITALIST
Payer: MEDICARE

## 2020-01-15 VITALS
OXYGEN SATURATION: 94 % | SYSTOLIC BLOOD PRESSURE: 106 MMHG | TEMPERATURE: 98 F | BODY MASS INDEX: 29.29 KG/M2 | HEART RATE: 85 BPM | RESPIRATION RATE: 18 BRPM | HEIGHT: 61 IN | WEIGHT: 155.13 LBS | DIASTOLIC BLOOD PRESSURE: 61 MMHG

## 2020-01-15 PROCEDURE — 99239 HOSP IP/OBS DSCHRG MGMT >30: CPT | Performed by: HOSPITALIST

## 2020-01-15 PROCEDURE — 71046 X-RAY EXAM CHEST 2 VIEWS: CPT | Performed by: HOSPITALIST

## 2020-01-15 RX ORDER — ALBUTEROL SULFATE 2.5 MG/3ML
2.5 SOLUTION RESPIRATORY (INHALATION) EVERY 6 HOURS PRN
Status: DISCONTINUED | OUTPATIENT
Start: 2020-01-15 | End: 2020-01-15

## 2020-01-15 RX ORDER — CEPHALEXIN 500 MG/1
500 CAPSULE ORAL 2 TIMES DAILY
Qty: 10 CAPSULE | Refills: 0 | Status: ON HOLD | OUTPATIENT
Start: 2020-01-15 | End: 2020-06-07

## 2020-01-15 NOTE — CM/SW NOTE
01/15/20 1611   Discharge disposition   Expected discharge disposition Home-Health   Name of Renzo Washburn 336  (resilience )   Additional Home Care/Hospice Provider   (bela fernandez)   Discharge transportation [facility]

## 2020-01-15 NOTE — PROGRESS NOTES
NURSING DISCHARGE NOTE    Discharged Other, (see nursing note) via Wheelchair. Accompanied by Support staff  Belongings Taken by patient/family. Pt received discharge instructions and education. Report called to facility.   aware of discharge

## 2020-01-15 NOTE — PLAN OF CARE
Problem: RESPIRATORY - ADULT  Goal: Achieves optimal ventilation and oxygenation  Description  INTERVENTIONS:  - Assess for changes in respiratory status  - Assess for changes in mentation and behavior  - Position to facilitate oxygenation and minimize r Hx of afib on Pradaxa. Voids. Briefed. Incontinent at night. Denies pain. OT. X1 with walker. No IV, Ok per MD. PoADILENE keflex. Regular diet. Frequent rounding, needs met. Updated patient on plan of care. VSS. Bed alarm on. Call light within reach.

## 2020-01-15 NOTE — HOME CARE LIAISON
Received referral from [address] [facility] is not contracted with patient's insurance and is unable to accept at this time. LUC Crenshaw informed via Epic bubble chat.

## 2020-01-15 NOTE — OCCUPATIONAL THERAPY NOTE
Attempted to see pt for OT. Per RN, pt going off the floor for xray. Will re-attempt to see pt as appropriate and as able.

## 2020-01-15 NOTE — PROGRESS NOTES
Multidisciplinary Discharge Rounds held 1/15/2020. Treatment team members present today include , , Charge Nurse, Nurse, RT, PT and Pharmacy caring for [employer].      Other care providers present:    Mobility Goal: Up to chair

## 2020-01-15 NOTE — RESPIRATORY THERAPY NOTE
Patient received on 1 liter nasal cannula. Breath sounds- diminished/clear. Patient off nasal cannula this afternoon sating 94%. Incentive spirometry given to patient and encouraged to use during the day. Plan to wean neb treatments to prn for wheezing.

## 2020-01-15 NOTE — PHYSICAL THERAPY NOTE
Patient presented reclined in bedside chair; VSS and agreeable to participate. Transferred sit>stand w/ supervision assist.  Ambulated 300' w/RW and stand-by assist.  Maintained >90% SpO2 on 2 L NC throughout.   Upon completion, patient left reclined in be

## 2020-01-15 NOTE — CM/SW NOTE
01/15/20 1332   CM/SW Referral Data   Referral Source Physician   Reason for Referral Discharge planning   Informant Spouse   Pertinent Medical Hx   Primary Care Physician Name dr Iram Aragon   Patient Info   Patient's Mental Status Alert;Oriented;Memory Imp

## 2020-01-15 NOTE — PROGRESS NOTES
[facility] Pharmacy Note:  Renal Adjustment for cephalexin [facility])    Laci Summers is a 80year old female who has been prescribed cephalexin (KEFLEX) 500 mg every 6 hrs. CrCl is estimated creatinine clearance is 39.3 mL/min (based on SCr of 0.79 mg/dL).  so the

## 2020-01-16 NOTE — DISCHARGE SUMMARY
[facility]  DISCHARGE SUMMARY     Cookie Morse Patient Status:  Inpatient    1934 MRN [id number]   [facility] 5NW-A Attending No att. providers found   Hosp Day # 1 PCP Shannon Thomas     Date of Admission: 2020  Date of ·     Consultants:  •     Discharge Medication List:     Discharge Medications      START taking these medications      Instructions Prescription details   cephALEXin 500 MG Caps  Commonly known as:  KEFLEX      Take 1 capsule (500 mg total) by mouth 2 ( for Next 30 Days 1/16/2020 - 2/15/2020    None          Vital signs:  Temp:  [97.9 °F (36.6 °C)] 97.9 °F (36.6 °C)  Pulse:  [66-85] 85  Resp:  [16-18] 18  BP: (106-122)/(61-77) 106/61    Physical Exam:    General: No acute distress.    Respiratory: Clear to

## 2020-01-16 NOTE — CM/SW NOTE
[facility] accepted patient. Sent avs and d/c summary and notification that patient discharged 1/15.     Marielos Montejo RN,   Phone [phone]

## 2020-01-18 NOTE — PAYOR COMM NOTE
--------------  ADMISSION REVIEW     Payor: Yolie Camarena #:  [id number]  Authorization Number: [id number]    Admit date: 1/11/20  Admit time: 2335       Admitting Physician: Alecia Murry MD  Attending Physician:  No att. kailash kelly Other systems are as noted in HPI. Constitutional and vital signs reviewed. All other systems reviewed and negative except as noted above.     Physical Exam     ED Triage Vitals [01/11/20 1932]   /70   Pulse 94   Resp 20   Temp 97.4 °F (36.3 °C) Neutrophil Absolute 8.18 (*)     Monocyte Absolute 1.08 (*)     All other components within normal limits   CBC WITH DIFFERENTIAL WITH PLATELET   URINE CULTURE, ROUTINE      Xr Chest Ap Portable  (cpt=71045)  Result Date: 1/11/2020  PROCEDURE:  XR CHEST A Patient will be admitted for viral bronchitis with bronchospasm. Continue nebulizer treatments. Most likely a viral etiology. Also UTI. Family at bedside aware of plan. Patient admitted to medical floor for further work-up and evaluation.   Dr. Barb Levin • Cancer Sister    • Cancer Father    • No Known Problems Mother      Allergies:   Adhesive Tape               Medications:  No current facility-administered medications on file prior to encounter.    Dabigatran Etexilate Mesylate (PRADAXA) 75 MG Oral Cap, Diagnostic Data:      Labs:  Recent Labs   Lab 01/11/20 2030   WBC 12.6*   HGB 12.6   MCV 91.4   .0       Recent Labs   Lab 01/11/20 2030   GLU 99   BUN 13   CREATSERUM 0.79   GFRAA 79   GFRNAA 68   CA 9.1   ALB 3.5      K 3.8      CO2 Breath Sounds Pre-Treatment Bilateral Diminished   Breath Sounds Pre-Treatment Left Expiratory wheeze   Breath Sounds Post-Treatment Bilateral Diminished     01/12/20 1337    Respiratory Therapy / Neb Tx   MD Order alb   Pre-Treatment Pulse 90   Pre-Treatm These impairments and comorbidities manifest themselves as functional limitations in independent bed mobility, transfers, and gait.   The patient is below baseline and would benefit from skilled inpatient PT to address the above deficits to assist patient i Weaned to 1L at rest, baseline room air, left lung with rhinchi and expiratory wheezes, diminished on right. Productive cough with thick yellow sputum. PO steroids, nebs. On pradaxa for A Fib. Briefed, incontinent at times. Denies urinary symptoms. IV abx. allopurinol (ZYLOPRIM) tab 100 mg   Dose: 100 mg  Freq: Daily Route: OR  Start: 01/11/20 2345 End: 01/15/20 2043    (8965)-Not Given        0945-Given        0853-Given        0831-Given        0815-Given   2043-D/C'd      amLODIPine Besylate (NORVASC) tab 0059-New Bag        0000-New Bag   0030-Stopped     0849-D/C'd         cephALEXin (KEFLEX) cap 500 mg   Dose: 500 mg  Freq: Every 8 hours Route: OR  Start: 01/15/20 0100 End: 01/15/20 2043    Order specific questions:   What infection is this being used t Freq: Once Route: Nebulization  Start: 01/11/20 1947 End: 01/11/20 1954    Order specific questions:    Which area/hospital ED or IC  Method of delivery Intermittent    1954-Given              ipratropium-albuterol (DUONEB) nebulizer solution 3 mL   Dose: 3 Start: 01/12/20 0900 End: 01/12/20 0649     0649-D/C'd                 Medications 01/11/20 01/12/20 01/13/20 01/14/20 01/15/20   acetaminophen (TYLENOL) tab 650 mg   Dose: 650 mg  Freq: Every 6 hours PRN Route: OR  PRN Reasons: mild pain,Headaches,Fever

## 2020-06-06 ENCOUNTER — HOSPITAL ENCOUNTER (OUTPATIENT)
Facility: HOSPITAL | Age: 85
Setting detail: OBSERVATION
Discharge: NURSING, REHAB OR LONG-TERM CARE | End: 2020-06-10
Attending: EMERGENCY MEDICINE | Admitting: HOSPITALIST
Payer: MEDICARE

## 2020-06-06 ENCOUNTER — APPOINTMENT (OUTPATIENT)
Dept: GENERAL RADIOLOGY | Facility: HOSPITAL | Age: 85
End: 2020-06-06
Attending: INTERNAL MEDICINE
Payer: MEDICARE

## 2020-06-06 DIAGNOSIS — K62.5 RECTAL BLEEDING: Primary | ICD-10-CM

## 2020-06-06 PROBLEM — D64.9 ANEMIA: Status: ACTIVE | Noted: 2020-06-06

## 2020-06-06 PROBLEM — R79.89 AZOTEMIA: Status: ACTIVE | Noted: 2020-06-06

## 2020-06-06 PROCEDURE — 71045 X-RAY EXAM CHEST 1 VIEW: CPT | Performed by: INTERNAL MEDICINE

## 2020-06-06 PROCEDURE — 99220 INITIAL OBSERVATION CARE,LEVL III: CPT | Performed by: INTERNAL MEDICINE

## 2020-06-06 RX ORDER — CARVEDILOL 6.25 MG/1
6.25 TABLET ORAL 2 TIMES DAILY WITH MEALS
Status: DISCONTINUED | OUTPATIENT
Start: 2020-06-07 | End: 2020-06-07

## 2020-06-06 RX ORDER — SODIUM CHLORIDE 9 MG/ML
125 INJECTION, SOLUTION INTRAVENOUS CONTINUOUS
Status: DISCONTINUED | OUTPATIENT
Start: 2020-06-06 | End: 2020-06-07

## 2020-06-06 RX ORDER — ONDANSETRON 2 MG/ML
4 INJECTION INTRAMUSCULAR; INTRAVENOUS EVERY 4 HOURS PRN
Status: DISCONTINUED | OUTPATIENT
Start: 2020-06-06 | End: 2020-06-10

## 2020-06-06 RX ORDER — SODIUM CHLORIDE 9 MG/ML
INJECTION, SOLUTION INTRAVENOUS CONTINUOUS
Status: DISCONTINUED | OUTPATIENT
Start: 2020-06-06 | End: 2020-06-07

## 2020-06-06 NOTE — ED PROVIDER NOTES
Patient Seen in: [facility] Emergency Department      History   Patient presents with:  Bleeding    Stated Complaint: Abdomen pain    HPI    This is a 20-year-old female who arrives with complaints of bright blood in her stool this morning.   The pat None (Room air)       Current:/78   Pulse 80   Temp 98.4 °F (36.9 °C) (Temporal)   Resp 18   SpO2 97%         Physical Exam    General: . Older female no respiratory distress. Conjunctiva are not pale. The patient is in no respiratory distress.  [place]ana Goodman The following orders were created for panel order CBC WITH DIFFERENTIAL WITH PLATELET.   Procedure                               Abnormality         Status                     ---------                               -----------         ------ fibrillation and on chronic Pradaxa. She was last seen by us in May of this year, when she presented with acute blood loss anemia and gross hematochezia.   Colonoscopy by my partner, Dr. Anival Biswas, revealed diverticulosis with a suspicion of sigmoid diver

## 2020-06-06 NOTE — ED INITIAL ASSESSMENT (HPI)
Patient arrived via EMS from [facility]. Per EMS patient had bright red blood in stool this morning. Patient is A&Ox4 and only reports some lower abdomen pain with no N/V/D.

## 2020-06-07 PROCEDURE — 99224 SUBSEQUENT OBSERVATION CARE: CPT | Performed by: HOSPITALIST

## 2020-06-07 RX ORDER — MULTIVITAMIN
1 TABLET ORAL DAILY
COMMUNITY

## 2020-06-07 RX ORDER — CARVEDILOL 12.5 MG/1
12.5 TABLET ORAL 2 TIMES DAILY WITH MEALS
Status: DISCONTINUED | OUTPATIENT
Start: 2020-06-07 | End: 2020-06-08

## 2020-06-07 RX ORDER — SODIUM CHLORIDE 9 MG/ML
INJECTION, SOLUTION INTRAVENOUS CONTINUOUS
Status: DISCONTINUED | OUTPATIENT
Start: 2020-06-07 | End: 2020-06-07

## 2020-06-07 RX ORDER — SODIUM CHLORIDE 9 MG/ML
INJECTION, SOLUTION INTRAVENOUS CONTINUOUS
Status: DISCONTINUED | OUTPATIENT
Start: 2020-06-07 | End: 2020-06-08

## 2020-06-07 RX ORDER — POTASSIUM CHLORIDE 14.9 MG/ML
20 INJECTION INTRAVENOUS ONCE
Status: COMPLETED | OUTPATIENT
Start: 2020-06-07 | End: 2020-06-07

## 2020-06-07 RX ORDER — POTASSIUM CHLORIDE 20 MEQ/1
40 TABLET, EXTENDED RELEASE ORAL ONCE
Status: COMPLETED | OUTPATIENT
Start: 2020-06-07 | End: 2020-06-07

## 2020-06-07 RX ORDER — METOPROLOL TARTRATE 5 MG/5ML
5 INJECTION INTRAVENOUS ONCE
Status: DISCONTINUED | OUTPATIENT
Start: 2020-06-07 | End: 2020-06-08

## 2020-06-07 RX ORDER — DONEPEZIL HYDROCHLORIDE 10 MG/1
10 TABLET, FILM COATED ORAL NIGHTLY
COMMUNITY

## 2020-06-07 NOTE — CONSULTS
Gastroenterology Initial Consultation    Maico Cabello Patient Status:  Observation    1934 MRN [id number]   [facility] [place] 5NW-A Attending Giovana Alvarado MD   Hosp Day # 0 PCP Ana Rosa Moore MD [facility]       Reason for Consultation/Chief Compla Medications:   •  0.9% NaCl infusion, , Intravenous, Continuous  •  potassium chloride IVPB premix 20 mEq, 20 mEq, Intravenous, Once  •  ondansetron HCl (ZOFRAN) injection 4 mg, 4 mg, Intravenous, Q4H PRN  •  carvedilol (COREG) tab 6.25 mg, 6.25 mg, Oral, at this time  -ok to restart pradaxa given afib, would hold aspirin   -stable hgb thus ok for discharge from gi standpoint, if rebleeding occurs consideration to holding pradaxa will need to be made at discretion of cardiology   -advance diet as tolerated

## 2020-06-07 NOTE — PROGRESS NOTES
Assumed care of patient today at 0730. Alert and oriented, but forgetful/poor historian. Patient with no hematochezia thus far. Diet advanced to soft. Tolerated well. No c/o nausea/vomiting, abdominal pain.   Plan of care discussed with patient and s/w

## 2020-06-07 NOTE — PROGRESS NOTES
Patient with atrial fibrillation with rapid rates   Plan:  Metoprolol 5 IV x 1 now  Increase Coreg to 12.5 BID  If persistent, will give Dig  Monitor hemodynamics  Telemetry  Discussed with MAXINE Arreola MD

## 2020-06-07 NOTE — H&P
[facility]  History and Physical     Nevaeh Lubin Patient Status:  Emergency    1934 MRN [id number]   Location [address] [facility] Attending Douglas Hernandez MD   Hosp Day # 0 PCP Uma Enciso MD [facility]     Chief Complaint: Sonal Centeno Heart Disorder Sister    • Cancer Sister    • Heart Disorder Brother    • Heart Disorder Sister    • Cancer Sister    • Cancer Father    • No Known Problems Mother        Allergies:   Adhesive Tape               Medications:  No current facility-administer or gallops. Equal pulses. Chest and Back: No tenderness or deformity. Abdomen: Soft, nontender, nondistended. Positive bowel sounds. No rebound, guarding or organomegaly. Neurologic: No focal neurological deficits. CNII-XII grossly intact.   Musculoske

## 2020-06-07 NOTE — PROGRESS NOTES
[facility]  Progress Note     Winnie Ayde Patient Status:  Observation    1934 MRN [id number]   [facility] 5NW-A Attending Job Dial, MD   Hosp Day # 0 PCP Shelby Valdovinos MD [facility]     Chief Complaint: GIB    S: Patient denies new anticoagulants  4. Check iron, ferritin - added   5. Monitor H&H  6. GI evaluation   3. Leukocytosis, CXR neg, resolved  1. UA pending  4. Atrial fibrillation   5. Essential hypertension  1. Coreg, all other agents on hold  2. Monitor hemodynamics  3.  Tele

## 2020-06-07 NOTE — PROGRESS NOTES
Patient with atrial fibrillation heart rate sustaining 120s-130s-as high as 140s-asymptomatic- while sitting at side of bed eating. Notifed MD. Orders rec'd for IV beta blocker and increase dose beta blocker. IV infiltrated.   Unable to estblish IV access

## 2020-06-08 PROCEDURE — 99226 SUBSEQUENT OBSERVATION CARE: CPT | Performed by: HOSPITALIST

## 2020-06-08 PROCEDURE — 99214 OFFICE O/P EST MOD 30 MIN: CPT | Performed by: INTERNAL MEDICINE

## 2020-06-08 RX ORDER — METOPROLOL TARTRATE 50 MG/1
50 TABLET, FILM COATED ORAL
Status: DISCONTINUED | OUTPATIENT
Start: 2020-06-08 | End: 2020-06-10

## 2020-06-08 RX ORDER — ACETAMINOPHEN 325 MG/1
650 TABLET ORAL EVERY 6 HOURS PRN
Status: DISCONTINUED | OUTPATIENT
Start: 2020-06-08 | End: 2020-06-10

## 2020-06-08 RX ORDER — METOPROLOL TARTRATE 5 MG/5ML
5 INJECTION INTRAVENOUS EVERY 6 HOURS PRN
Status: DISCONTINUED | OUTPATIENT
Start: 2020-06-08 | End: 2020-06-10

## 2020-06-08 RX ORDER — FUROSEMIDE 20 MG/1
20 TABLET ORAL DAILY
Status: DISCONTINUED | OUTPATIENT
Start: 2020-06-09 | End: 2020-06-10

## 2020-06-08 RX ORDER — FUROSEMIDE 10 MG/ML
20 INJECTION INTRAMUSCULAR; INTRAVENOUS ONCE
Status: COMPLETED | OUTPATIENT
Start: 2020-06-08 | End: 2020-06-08

## 2020-06-08 NOTE — PROGRESS NOTES
[facility] Cardiology Progress Note    Patient seen and examined.  Chart reviewed.  Discussed with RN. Patient from a extender care facility that has reported COVID cases in the past, hence in [facility] isolation currently.   She is asymptomatic and denies cough, f done most appropriately in the outpatient setting with her cardiologist at [facility], Dr. Annabella Cuevas.        Will follow,    Klaudia Espinoza MD

## 2020-06-08 NOTE — PROGRESS NOTES
[facility] Gastroenterology Progress Note    S: Called back about recurrent hematochezia. Pt with blood in the stool and in bowl. Pt denies abd pain, LH, dizziness.      O: /51 (BP Location: Left arm)   Pulse 69   Temp 97.7 °F (36.5 °C) (Oral)

## 2020-06-08 NOTE — PROGRESS NOTES
Alert and oriented x 4, forgetful at times. VSS on RA. Cardiology saw patient, metoprolol reordered, Pradaxa still on hold. Still having bloody stools. Discussed with GI who saw patient again today. IV lasix x 1 today, PO lasix tomorrow.  Updated patient an

## 2020-06-08 NOTE — PLAN OF CARE
Patient denied pain/n/v. Continued to have blood in stool. Voiding freely. LFLR diet. Pradaxa dc'd this am, abx started for uti. Cardio consulted. Safety maintained. VSS.

## 2020-06-08 NOTE — PLAN OF CARE
paged regarding restarting Pradaxa, informed him that patient had 2 bloody stools today. Per Dr. Jenny Garnett okay to restart Pradaxa. Also informed him of UA results, no new orders received,.

## 2020-06-08 NOTE — CM/SW NOTE
06/08/20 1100   CM/SW Screening   Referral Source Nurse   Information Source Chart review;Nursing rounds   Patient's Mental Status Alert;Oriented   Patient's [address] Name   (Gene Mao)   Order for d/c plannin

## 2020-06-08 NOTE — PROGRESS NOTES
[facility]  Progress Note     Rohini Landon Patient Status:  Observation    1934 MRN [id number]   [facility] 5NW-A Attending Judd Reid MD   Hosp Day # 0 PCP Favio Montes MD [facility]     Chief Complaint: GIB    S: Patient with inter PLAN:     1. Dyspnea suspect d/t pulmonary edema, acute  1. Stop IVF  2. Lasix IV x 1, resume oral regimen tomorrow  3. Pulse oximetry  2. Hematochezia on Aspirin and Pradaxa, history of diverticular bleed  3. Anemia, acute blood loss  1.  Hold antiplatelet

## 2020-06-09 PROCEDURE — 99224 SUBSEQUENT OBSERVATION CARE: CPT | Performed by: HOSPITALIST

## 2020-06-09 PROCEDURE — 99214 OFFICE O/P EST MOD 30 MIN: CPT | Performed by: INTERNAL MEDICINE

## 2020-06-09 RX ORDER — CIPROFLOXACIN 500 MG/1
500 TABLET, FILM COATED ORAL 2 TIMES DAILY
Qty: 6 TABLET | Refills: 0 | Status: SHIPPED | OUTPATIENT
Start: 2020-06-10 | End: 2020-06-13

## 2020-06-09 RX ORDER — POTASSIUM CHLORIDE 20 MEQ/1
40 TABLET, EXTENDED RELEASE ORAL EVERY 4 HOURS
Status: COMPLETED | OUTPATIENT
Start: 2020-06-09 | End: 2020-06-09

## 2020-06-09 RX ORDER — METOPROLOL TARTRATE 50 MG/1
50 TABLET, FILM COATED ORAL
Qty: 60 TABLET | Refills: 3 | Status: SHIPPED | OUTPATIENT
Start: 2020-06-09

## 2020-06-09 NOTE — PROGRESS NOTES
[facility]  Cardiology Progress Note    Subjective:  No chest pain or shortness of breath. Denies any further blood stools or other bleeding. Chart reviewed. No plans for endoscopy per GI.     Objective:  /60 (BP Location: Left arm)   Pulse 70 Patrick after discharge. Pradaxa remains on hold for now. Will review today w/ Dr. Margaret Rider. Hopefully home soon.     DREW Mackey  6/9/2020  12:01 PM    Patient seen and examined independently.  Note reviewed and labs reviewed.  Agree with above

## 2020-06-09 NOTE — PROGRESS NOTES
Pt's rapid covid negative. Isolation continued pr Id recs since pt is from [facility] where they have confirmed covid cases. [id number] per Dr Vitor Prince to transfer pt off covid unit to medical bed. Nursing supervisor aware and will call when bed available.

## 2020-06-09 NOTE — CM/SW NOTE
Received notification from rn d/c cancelled. Call to al at [facility] and [place] [facility] to inform.     Tam Gotti RN,   Phone [phone]

## 2020-06-09 NOTE — PROGRESS NOTES
GI and cards s/o, pt to hold Pradaxa until f/u with cardiology in 7-10 days. Plan to d/c back to [facility] tomorrow Wednesday.

## 2020-06-09 NOTE — PROGRESS NOTES
VSS, pt denies pain, abdominal discomfort, nausea. GI made aware of 1 bloody stool this AM. Plan is to d/c back to [facility] today. Contact/droplet iso in neg pressure room. Will cont to monitor.

## 2020-06-09 NOTE — PHYSICAL THERAPY NOTE
PHYSICAL THERAPY QUICK EVALUATION - INPATIENT    Room Number: 507/507-A  Evaluation Date: 6/9/2020  Presenting Problem: melena, pulmonary edema  Physician Order: PT Eval and Treat    Problem List  Principal Problem:    Rectal bleeding  Active Problems: (including adjusting bedclothes, sheets and blankets)?: A Little   -   Sitting down on and standing up from a chair with arms (e.g., wheelchair, bedside commode, etc.): A Little   -   Moving from lying on back to sitting on the side of the bed?: A Little Pt D/C home. PT Discharge Recommendations: Home    PLAN  Patient has been evaluated and presents with no skilled Physical Therapy needs at this time. Patient discharged from Physical Therapy services.   Please re-order if a new functional limitation pres

## 2020-06-09 NOTE — PROGRESS NOTES
[facility] Gastroenterology Progress Note    I did not enter patients room today due to policy to minimize PPE use/exposure in setting of current COVID-19 epidemic. Interview was conducted over the phone and with aid of RN.      S: Pt with a BM with s

## 2020-06-09 NOTE — PROGRESS NOTES
[facility]  Progress Note     Anurag Davidson Patient Status:  Observation    1934 MRN [id number]   [facility] 5NW-A Attending Tommy Argueta MD   Hosp Day # 0 PCP Shlomo Peterson MD [facility]     Chief Complaint: GIB    S: Patient seen Blanca Juarez Imaging: Imaging data reviewed in Epic. Medications:   • cefTRIAXone  1 g Intravenous Q24H   • furosemide  20 mg Oral Daily   • metoprolol tartrate  50 mg Oral 2x Daily(Beta Blocker)       ASSESSMENT / PLAN:     1.  Dyspnea suspect d/t pulmonary erica

## 2020-06-09 NOTE — PLAN OF CARE
Pt AOX4. Has no pain. No BM since in am. Pt reported of \"feeling better\" compared yesterday. IV Rocephin for UTI. Up with min using the walker. Tele - Afib. RA maintained all night. Bed alarm On. Call light within reach. Safety precs in [facility].

## 2020-06-09 NOTE — CM/SW NOTE
06/09/20 1231   Discharge disposition   Expected discharge disposition [facility]   Name of Facillity[facility]Home Care/Hospice [facility]  [facility]   Discharge transportation [facility] to [facility] [place], confirmed patient can retu

## 2020-06-10 VITALS
DIASTOLIC BLOOD PRESSURE: 64 MMHG | WEIGHT: 149.38 LBS | BODY MASS INDEX: 28 KG/M2 | SYSTOLIC BLOOD PRESSURE: 122 MMHG | TEMPERATURE: 97 F | HEART RATE: 68 BPM | OXYGEN SATURATION: 96 % | RESPIRATION RATE: 18 BRPM

## 2020-06-10 PROCEDURE — 99217 OBSERVATION CARE DISCHARGE: CPT | Performed by: HOSPITALIST

## 2020-06-10 NOTE — CM/SW NOTE
Called [address] to notify of today, rn to call report to [phone], will also need avs faxed to [phone] when available.     Call to spouse, informed of d/c today, requesting medicar transportation, reviewed cost of medicar and agreeable to [facility] [employer] [facility]

## 2020-06-10 NOTE — PROGRESS NOTES
Took over care of patient @ 0480 66 01 75 Pt alert and orientatedx4. Room air. Pulse Ox. Tele- A-fib. Plan to be discharged today. Low fiber/soft diet. No reports of a bowel movement this shift. Up w/ stand by assist. IV antibiotics. Saline locked.  POC discussed wi

## 2020-06-10 NOTE — PLAN OF CARE
Patient alert and oriented x4, can be forgetful at times. RA. . Tele, afib. VSS. Denies pain. Tolerating diet. No bloody BMs overnight per report. No bloody BMs so far this shift. IV saline locked.  Plan of care discussed with patient, pt hopeful to dc t

## 2020-06-10 NOTE — PROGRESS NOTES
Patient discharged to [facility] at this time. Report given to Kimber GOODMAN. All questions and concerns addressed. IV removed. Dc paperwork given to EMS.

## 2020-06-10 NOTE — DISCHARGE SUMMARY
[facility] HOSPITALIST  DISCHARGE SUMMARY     Caridad Buerger Patient Status:  Observation    1934 MRN [id number]   [facility] 5NW-A Attending Nessa Martin MD   Hosp Day # 0 PCP Aleksandra Peacock     Date of Admission: 2020  Date of Disc hemodynamically stable and denies abdominal pain, fever or chills. Brief Synopsis: Gastroenterology and cardiology were placed on consultation. Pradaxa was held. Gastroenterology recommended no colonoscopy or endoscopy at this time.   Hemoglobin was st Refills:  0     losartan Potassium 50 MG Tabs  Commonly known as:  COZAAR      Take 50 mg by mouth daily. Refills:  0     multivitamin Tabs      Take 2 tablets by mouth 2 (two) times daily.    Refills:  0     One-Daily Multi Vitamins Tabs      Take 1 tabl edema.  -----------------------------------------------------------------------------------------------  PATIENT DISCHARGE INSTRUCTIONS: See electronic chart    Fara Colbert MD 6/10/2020    Time spent:  > 30 minutes

## 2021-03-12 NOTE — RESPIRATORY THERAPY NOTE
Assessment and Objective  \"Progressing\"  INTERVENTIONS:  - Assess for changes in respiratory status  - Assess for changes in mentation and behavior  - Position to facilitate oxygenation and minimize respiratory effort  - Oxygen supplementation based on o Pharmacist Discharge Medication Reconciliation    Significant PMH:   Past Medical History:   Diagnosis Date    Chronic kidney disease     Dementia ([facility]     Diabetes ([facility]     Gastrointestinal disorder     Glaucoma     Gout     Hypercholesteremia     Hypertension     Other ill-defined conditions(799.89)     gout, glaucoma    Other ill-defined conditions(799.89)     high cholesterol    Seizures ([facility]     Possible seizure 3/6 following administration of fentanyl     Chief Complaint for this Admission:   Chief Complaint   Patient presents with    Fall    Hip Pain     Allergies: Codeine and Fentanyl    Discharge Medications:   Discharge Medication List as of 3/12/2021  9:33 AM        START taking these medications    Details   acetaminophen (TYLENOL) 325 mg tablet Take 2 Tabs by mouth every six (6) hours for 9 days. , No Print, Disp-72 Tab, R-0      calcium-vitamin D (OS-EDUARDO +D3) 500 mg-200 unit per tablet Take 1 Tab by mouth three (3) times daily (with meals) for 60 days. , No Print, Disp-90 Tab, R-1      heparin sodium,porcine (heparin, porcine,) 5,000 unit/mL injection 1 mL by SubCUTAneous route every twelve (12) hours every twelve (12) hours for 21 days. , No Print, Disp-42 mL, R-0      polyethylene glycol (MIRALAX) 17 gram packet Take 1 Packet by mouth daily for 30 days. , No Print, Disp-30 Packet, R-0      senna-docusate (PERICOLACE) 8.6-50 mg per tablet Take 1 Tab by mouth two (2) times a day for 30 days. , No Print, Disp-60 Tab, R-0      oxyCODONE IR (ROXICODONE) 5 mg immediate release tablet Take 1 Tab by mouth every four (4) hours as needed for Pain for up to 14 days.  Max Daily Amount: 30 mg., Print, Disp-30 Tab, R-0      insulin lispro (HUMALOG) 100 unit/mL injection INITIATE CORRECTIVE INSULIN PROTOCOL (MEGAN):  RX MEGAN Normal Sensitivity (Average weight)    AC (before meals), Q6H, and Q4H CORRECTIONAL SCALE only For Blood Sugar (mg/dl) of :             140-199=2 units            200-249=3 units  250-299=5 units  300-3 49=7 units  350 or greater = Call MD  Give in addition to basal medications. Do Not Hold for NPO    BEDTIME CORRECTIONAL sliding scale when scheduled:  200-249=2 units  250-299=3 units   300-349=4 units  350 or greater = Call MD  Give in addition to bas al medications. Do Not Hold for NPO Fast Acting - Administer Immediately - or within 15 minutes of start of meal, if mealtime coverage., No Print, Disp-1 Vial, R-0      doxycycline (MONODOX) 100 mg capsule Take 1 Cap by mouth two (2) times a day for 7 days. , No Print, Disp-14 Cap, R-0      levoFLOXacin (Levaquin) 750 mg tablet Take 1 Tab by mouth daily for 7 days. , No Print, Disp-7 Tab, R-0           CONTINUE these medications which have NOT CHANGED    Details   LYRICA 50 mg capsule TAKE 1 CAPSULE BY MOUTH TWICE DAILY, Historical Med, R-5, MEL      FREESTYLE LAVINIA 14 DAY SENSOR kit USE AS DIRECTED, Historical Med, R-12, MEL      allopurinol (ZYLOPRIM) 100 mg tablet TAKE 1 TABLET BY MOUTH ONCE DAILY, Historical Med, R-7      LUMIGAN 0.01 % ophthalmic drops INSTILL 1 DROP INTO EACH EYE ONCE DAILY AT BEDTIME, Historical Med, R-3, MEL      cyanocobalamin (VITAMIN B-12) 1,000 mcg tablet Take 1,000 mcg by mouth daily. , Historical Med      SITagliptin (JANUVIA) 100 mg tablet Take 100 mg by mouth daily. , Historical Med      traZODone (DESYREL) 50 mg tablet Take 1 Tab by mouth nightly as needed for Sleep.  Indications: insomnia associated with depression, Print, Disp-14 Tab, R-0             The patient's chart, MAR and AVS were reviewed by Bethany Vega, PHARMD.    Discharging Provider: Robbin Higginbotham

## (undated) NOTE — MR AVS SNAPSHOT
[facility]               Thank you for choosing us for your health care visit with Rosario Fall PT.   We are glad to serve you and happy to provide you with this summary of your Take 20 mg by mouth nightly. Commonly known as:  LIPITOR           Losartan Potassium 25 MG Tabs   Take 25 mg by mouth daily. Commonly known as:  COZAAR           multivitamin Tabs   Take 2 tablets by mouth 2 (two) times daily.            PRADAXA 75 MG

## (undated) NOTE — IP AVS SNAPSHOT
Patient Demographics     Address  [address] [place] [address] JAM[place]  Leonora [place] [address] Phone  [phone] [facility]  [phone] [facility]      Emergency Contact(s)     Name Relation Home Work Mobile    Vee Spouse   [phone]      Allergies as of 1/15/2020  Revie Commonly known as:  LASIX  Next dose due:  1/16      Take 20 mg by mouth daily. hydrALAzine HCl 25 MG Tabs  Commonly known as:  APRESOLINE  Next dose due:  1/15 evening       Take 25 mg by mouth 3 (three) times daily.           losartan Potassium 5 [id number] furosemide (LASIX) tab 20 mg 01/15/20 0816 Given      [id number] guaiFENesin ER (MUCINEX) 12 hr tab 1,200 mg 01/14/20 2042 Given      [id number] guaiFENesin ER (MUCINEX) 12 hr tab 1,200 mg 01/15/20 0814 Given      [id number] hydrALAzine HCl (APRESO Specimen:  Other from Nasopharyngeal swab      Adenovirus PCR: Negative     Coronavirus 229E PCR: Negative     Coronavirus Hku1 PCR: Negative     Coronavirus Nl63 PCR: Negative     Coronavirus Oc43 PCR: Negative     Metapneumovirus PCR: Positive     Rhino Performed by Chuy Garcia MD at [facility] ENDOSCOPY   • KNEE REPLACEMENT SURGERY     • LUMPECTOMY RIGHT         Social History:  reports that she quit smoking about 24 years ago. She smoked 0.00 packs per day.  She has never used smokeless tobacco. She repor Neck: No lymphadenopathy. No JVD. No carotid bruits. Respiratory: mild wheezes, minimal rhonchi  Cardiovascular: S1, S2. IR IR No murmurs, no rubs or gallops. Equal pulses. Chest and Back: No tenderness or deformity.   Abdomen: Soft, nontender, nondisten FA.3 Giana Toussaint MD on 1/12/2020  6:53 AM  FA. 4 - Kenan Sullivan MD on 1/12/2020 12:53 AM               H&P signed by Kenan Sullivan MD at 1/12/2020  6:53 AM  Version 3 of 4    Author:  Kenan Sullivan MD Service:  — Author Type:  Physician    File • Heart Disorder Brother    • Heart Disorder Sister    • Cancer Sister    • Cancer Father    • No Known Problems Mother        Allergies:   Adhesive Tape               Medications:  No current facility-administered medications on file prior to encounter. Psychiatric: Appropriate mood and affect.       Diagnostic Data:      Labs:  Recent Labs   Lab 01/11/20 2030   WBC 12.6*   HGB 12.6   MCV 91.4   .0       Recent Labs   Lab 01/11/20 2030   GLU 99   BUN 13   CREATSERUM 0.79   GFRAA 79   GFRNAA 68   C Carlos Enrique Hughes Patient Status:  Emergency    1934 MRN [id number]   Location [address] [facility] [address] [facility] Day # 0 PCP Eros George     Chief Complaint: cough    History of Present Illness: Carlos Enrique Hughes is Atorvastatin Calcium (LIPITOR) 20 MG Oral Tab, Take 20 mg by mouth nightly., Disp: , Rfl:   Losartan Potassium (COZAAR) 25 MG Oral Tab, Take 25 mg by mouth daily. , Disp: , Rfl:   OCUVITE (OCUVITE) Oral Tab, Take 2 tablets by mouth 2 (two) times daily. , Dis ASSESSMENT / PLAN:     1. Viral bronchitis with bronchospasms  1. RVP: Metapneumovirus  2. CXR noted  3. Nebs  4. Symptomatic control  2. A. fib  3.  HTN  4. DL      Quality:  · DVT Prophylaxis: ambulate  · CODE status: full  · Baez: no    Plan of care dis • KNEE REPLACEMENT SURGERY     • LUMPECTOMY RIGHT         Social History:  reports that she quit smoking about 24 years ago. She smoked 0.00 packs per day.  She has never used smokeless tobacco. She reports current alcohol use of about 2.0 standard drinks o Respiratory: mild wheezes, minimal rhonchi  Cardiovascular: S1, S2. IR IR No murmurs, no rubs or gallops. Equal pulses. Chest and Back: No tenderness or deformity. Abdomen: Soft, nontender, nondistended. Positive bowel sounds.  No rebound, guarding or o Filed:  1/15/2020 10:36 AM Date of Service:  1/15/2020 10:32 AM Status:  Signed    :  Chelsie Kahn (PT Aide)       Patient presented reclined in bedside chair; VSS and agreeable to participate.   Transferred sit>stand w/ supervision assist.  Theadore Bosworth • Muscle weakness    • Other and unspecified hyperlipidemia    • Unspecified essential hypertension        Past Surgical History  Past Surgical History:   Procedure Laterality Date   • COLONOSCOPY N/A 5/8/2015    Performed by Yumi Ramos MD at [facility] END Pt demos sit to/from stand with supervision. Ambulation completed x300ft with use of RW and shuffled gait. No LOB present. Pt with 1 standing rest break provided after ~150ft. Seated exercises performed as below.       THERAPEUTIC EXERCISES  Lower Extre Goal Comments: Goals established on 1/12/2020, all goals met 1/13/2020[AE.1]                   Attribution Key    AE. 1 - Valentine Chu, PT on 1/13/2020  3:46 PM                        Occupational Therapy Notes (last 72 hours) (Notes from 1/12/2020  4:45

## (undated) NOTE — MR AVS SNAPSHOT
19 [facility] 66, [facility] 34 [facility]               Thank you for choosing us for your health care visit with Ilana Leon PT.   We are glad to serve you and happy to provide you with this summary of your allopurinol 100 MG Tabs   Take 100 mg by mouth daily. Commonly known as:  ZYLOPRIM           aspirin 81 MG Tabs   Take 81 mg by mouth daily. atenolol 50 MG Tabs   Take 50 mg by mouth 2 (two) times daily.    Commonly known as:   EAT THESE FOODS MORE OFTEN: EAT THESE FOODS LESS OFTEN:   Make half your plate fruits and vegetables Highly refined, white starches including white bread, rice and pasta   Eat plenty of protein, keep the fat content low Sugars:  sodas and sports drinks, ca

## (undated) NOTE — Clinical Note
Patient Name: Randy Mendenhall  YOB: 1934         MRN number:  [id number]  Date:  6/16/2017                            Referring Physician:  Altaf Heck     UPPER EXTREMITY EVALUATION:    Referring Physician: Dr. Jacques Akbar: Left should Flexion: L 140 deg  Abduction:  L 140 deg  ER: L 25 deg   WNL   Accessory motion: L GHJ: hypo  Flexibility: pectorals:  Moderate restrictions    Strength/MMT:  Shoulder Elbow Scapular   Flexion: R 4/5; L 2+/5  Abduction: R 4/5; L 2+/5  ER: R 4/5; L 2/5  IR: for this course of care. Thank you for your referral. Please co-sign or sign and return this letter via fax as soon as possible to [phone].  If you have any questions, please contact me at Dept: [phone]    Sincerely,   Electronically signed by

## (undated) NOTE — IP AVS SNAPSHOT
[address]            (For Outpatient Use Only) Initial Admit Date: 10/12/2019   Inpt/Obs Admit Date: Inpt: N/A / Obs: 10/12/19   Discharge Date:    Carson Oconnor:  [de-identified]   MRN: [de-identified]   CSN: [id number]   CEID: [id number] October 17, 2019

## (undated) NOTE — IP AVS SNAPSHOT
[address]            (For Outpatient Use Only) Initial Admit Date: 6/6/2020   Inpt/Obs Admit Date: Inpt: N/A / Obs: 06/06/20   Discharge Date:    Lamin Pouch:  [de-identified]   MRN: [de-identified]   CSN: [id number]   CEID: [id number]

## (undated) NOTE — IP AVS SNAPSHOT
[address]            (For Outpatient Use Only) Initial Admit Date: 1/11/2020   Inpt/Obs Admit Date: Inpt: 1/14/20 / Obs: 01/11/20   Discharge Date:    Karen Proper:  [de-identified]   MRN: [de-identified]   CSN: [id number]   CEID: [id number]

## (undated) NOTE — IP AVS SNAPSHOT
Patient Demographics     Address  [address] [place] [address]  [address] [address] Phone  [phone] [facility]  [phone] [facility]      Emergency Contact(s)     Name Relation Home Work Mobile    Vee Spouse   [phone]      Allergies as of 10/17/2019  Revi Order ID Medication Name Action Time Action Reason Comments    [id number] Dabigatran Etexilate Mesylate (PRADAXA) cap 75 mg 10/16/19 2021 Given      [id number] Dabigatran Etexilate Mesylate (PRADAXA) cap 75 mg 10/17/19 0942 Given      [id number] Triamterene- Susceptibility      Klebsiella pneumoniae     Not Specified    Ampicillin  Resistant    Ampicillin + Sulbactam <=2  Sensitive    Cefazolin <=4  Sensitive    Ciprofloxacin <=0.25  Sensitive    Gentamicin <=1  Sensitive    Levofloxacin <=0.12  Sensitive    M per day. She has never used smokeless tobacco. She reports current alcohol use.     Family History:   Family History   Problem Relation Age of Onset   • Heart Disorder Son    • Heart Disorder Sister    • Cancer Sister    • Heart Disorder Brother    • Heart Musculoskeletal: Moves all extremities. Extremities: No edema or cyanosis. Integument: No rashes or lesions. Psychiatric: Appropriate mood and affect.       Diagnostic Data:      Labs:  Recent Labs   Lab 10/12/19  0452   WBC 11.7*   HGB 13.3   MCV 93.2 Room Number: 5387/7070-I     Session: 1   Number of Visits to Meet Established Goals: 5    Presenting Problem: T12 compression fracture; fall at home (2x in past 7 days);  UTI    Problem List  Principal Problem:    Fall, initial encounter  Active Problems: How much help from another person does the patient currently need. ..   -   Moving to and from a bed to a chair (including a wheelchair)?: A Little   -   Need to walk in hospital room?: A Little   -   Climbing 3-5 steps with a railing?: A Lot       AM-PAC S assistance level: supervision      Goal #3 Patient is able to ambulate 150 feet with assist device: walker - rolling at assistance level: supervision      Goal #4 Asc/danielle 2 steps with railing and min assist.    Goal #5     Goal #6     Goal Comments: Goals • Unspecified essential hypertension        Past Surgical History  Past Surgical History:   Procedure Laterality Date   • COLONOSCOPY N/A 5/8/2015    Performed by Darius Messer MD at [facility] ENDOSCOPY   • KNEE REPLACEMENT SURGERY     • LUMPECTOMY RIGHT Upper extremity ROM is within functional limits     Upper extremity strength is within functional limits     COORDINATION  Gross Motor    [facility]      Fine Motor    [facility]      ADDITIONAL TESTS                                    NEUROLOGICAL FINDINGS outcome measures completed include AMPAC, MMT, ROM. In this OT evaluation patient presents with the following performance deficits: endurance, safety, pain, activity tolerance, mobility, self-care.  These deficits impact the patient’s ability to participate Patient will transfer to toilet:  with supervision    UE Exercise Program Goal  Patient will be supervision with bilateral AROM HEP (home exercise program). [KP.1]     Attribution Alatorre    KP. 1 - Lillia Koyanagi, OT on 10/14/2019  5:13 PM

## (undated) NOTE — IP AVS SNAPSHOT
Patient Demographics     Address  [address] [place] [address]  [address] [address] Phone  [phone] [facility]  [phone] [facility]      Emergency Contact(s)     Name Relation Home Work Mobile    Vee Spouse   [phone]      Allergies as of 6/10/2020  Revie Commonly known as:  ARICEPT      Take 10 mg by mouth nightly. furosemide 20 MG Tabs  Commonly known as:  LASIX      Take 20 mg by mouth daily.           hydrALAzine HCl 25 MG Tabs  Commonly known as:  APRESOLINE      Take 25 mg by mouth 3 (three) t POTASSIUM [[id number]] (Normal)  Resulted: 06/09/20 1707, Result status: Final result   Ordering provider:  Jennifer Martínez MD  06/09/20 5290 Resulting lab:  [address] [facility])    Specimen Information    Type Source Collected On IRIS HOSPITALIST  History and Physical[RM. [address] [facility] [address] [place][RM.2] Patient Status:  Emergency    1934 MRN [id number]   Location [address] [facility] Attending Derrick Barbour MD   Hosp Day #[RM.1] 0[RM. 2] PCP[RM.1] Estela Marsh MD FEAR Family History:[RM.1]   Family History   Problem Relation Age of Onset   • Heart Disorder Son    • Heart Disorder Sister    • Cancer Sister    • Heart Disorder Brother    • Heart Disorder Sister    • Cancer Sister    • Cancer Father    • No Known Problems Respiratory: Clear to auscultation bilaterally. No wheezes. No rhonchi. Cardiovascular: S1, S2. Regular rate and rhythm. No murmurs, rubs or gallops. Equal pulses. Chest and Back: No tenderness or deformity. Abdomen: Soft, nontender, nondistended.   Pos Filed:  6/7/2020 10:21 AM Date of Service:  6/7/2020 10:16 AM Status:  Signed    :  Cathy Messer DO (Physician)     Consult Orders    1.  ED Consult to Gastroenterology [[id number]] ordered by Napoleon Springer MD at 06/06/20 2667 reports that she quit smoking about 24 years ago. She smoked 0.00 packs per day. She has never used smokeless tobacco. She reports current alcohol use of about 2.0 standard drinks of alcohol per week.     Allergies:    Adhesive Tape               Medicatio Hematochezia/Anemia  -hgb stable, no sign of ongoing bleeding.  Rectal with old blood in stool, no mass  -last colonoscopy 2015 with diverticulosis, this is the likely source in setting of anticoagulation and aspirin  -given stable hgb and prior diverticu • High cholesterol    • Muscle weakness    • Other and unspecified hyperlipidemia    • Unspecified essential hypertension        Past Surgical History  Past Surgical History:   Procedure Laterality Date   • COLONOSCOPY N/A 5/8/2015    Performed by Anna Waterman Approx Degree of Impairment: 46.58%   Standardized Score (AM-PAC Scale): 43.63   CMS Modifier (G-Code): CK      FUNCTIONAL ABILITY STATUS  Gait Assessment  Gait Assistance: Supervision  Distance (ft): 20  Assistive Device: Rolling walker  Pattern: Within F Patient able to ambulate on level surfaces[KW. 1] Safely and independently[KW. 2]        Attribution Alatorre    KW.1 Vaishali Harrell, PT on 6/9/2020  2:29 PM  KW.2 - eN Barnes, PT on 6/9/2020  3:13 PM                     Occupational Therapy Notes